# Patient Record
Sex: FEMALE | Race: WHITE | NOT HISPANIC OR LATINO | Employment: FULL TIME | URBAN - METROPOLITAN AREA
[De-identification: names, ages, dates, MRNs, and addresses within clinical notes are randomized per-mention and may not be internally consistent; named-entity substitution may affect disease eponyms.]

---

## 2017-02-03 RX ORDER — ZOLPIDEM TARTRATE 10 MG/1
10 TABLET ORAL
COMMUNITY

## 2017-02-03 RX ORDER — ACETAMINOPHEN AND CODEINE PHOSPHATE 300; 60 MG/1; MG/1
1 TABLET ORAL EVERY 4 HOURS PRN
COMMUNITY
End: 2017-11-09 | Stop reason: CLARIF

## 2017-02-03 RX ORDER — MULTIVIT WITH MINERALS/LUTEIN
1000 TABLET ORAL DAILY
COMMUNITY

## 2017-02-11 ENCOUNTER — ANESTHESIA EVENT (OUTPATIENT)
Dept: PERIOP | Facility: HOSPITAL | Age: 67
End: 2017-02-11
Payer: MEDICARE

## 2017-02-12 RX ORDER — SODIUM CHLORIDE, SODIUM LACTATE, POTASSIUM CHLORIDE, CALCIUM CHLORIDE 600; 310; 30; 20 MG/100ML; MG/100ML; MG/100ML; MG/100ML
20 INJECTION, SOLUTION INTRAVENOUS CONTINUOUS
Status: DISCONTINUED | OUTPATIENT
Start: 2017-02-13 | End: 2017-02-13 | Stop reason: HOSPADM

## 2017-02-13 ENCOUNTER — ANESTHESIA (OUTPATIENT)
Dept: PERIOP | Facility: HOSPITAL | Age: 67
End: 2017-02-13
Payer: MEDICARE

## 2017-02-13 ENCOUNTER — APPOINTMENT (OUTPATIENT)
Dept: RADIOLOGY | Facility: HOSPITAL | Age: 67
End: 2017-02-13
Payer: MEDICARE

## 2017-02-13 ENCOUNTER — HOSPITAL ENCOUNTER (OUTPATIENT)
Facility: HOSPITAL | Age: 67
Setting detail: OUTPATIENT SURGERY
Discharge: HOME | End: 2017-02-13
Attending: PODIATRIST | Admitting: PODIATRIST
Payer: MEDICARE

## 2017-02-13 VITALS
TEMPERATURE: 97.6 F | OXYGEN SATURATION: 94 % | HEIGHT: 65 IN | WEIGHT: 140 LBS | HEART RATE: 56 BPM | SYSTOLIC BLOOD PRESSURE: 118 MMHG | DIASTOLIC BLOOD PRESSURE: 66 MMHG | RESPIRATION RATE: 16 BRPM | BODY MASS INDEX: 23.32 KG/M2

## 2017-02-13 PROCEDURE — 73630 X-RAY EXAM OF FOOT: CPT

## 2017-02-13 PROCEDURE — C1713 ANCHOR/SCREW BN/BN,TIS/BN: HCPCS | Performed by: PODIATRIST

## 2017-02-13 PROCEDURE — 73660 X-RAY EXAM OF TOE(S): CPT

## 2017-02-13 DEVICE — 2.4MM/2.7MM VA-LOCKING CUBOID PLATE/RIGHT: Type: IMPLANTABLE DEVICE | Site: FOOT | Status: FUNCTIONAL

## 2017-02-13 DEVICE — 2.7MM VA LCKNG SCREW SLF-TPNG WITH T8 STARDRIVE RECESS 12MM: Type: IMPLANTABLE DEVICE | Site: FOOT | Status: FUNCTIONAL

## 2017-02-13 DEVICE — 2.7MM VA LCKNG SCREW SLF-TPNG WITH T8 STARDRIVE RECESS 14MM: Type: IMPLANTABLE DEVICE | Site: FOOT | Status: FUNCTIONAL

## 2017-02-13 RX ORDER — ONDANSETRON 2 MG/ML
4 INJECTION INTRAMUSCULAR; INTRAVENOUS EVERY 6 HOURS PRN
Status: DISCONTINUED | OUTPATIENT
Start: 2017-02-13 | End: 2017-02-13 | Stop reason: HOSPADM

## 2017-02-13 RX ORDER — FENTANYL CITRATE/PF 50 MCG/ML
25 SYRINGE (ML) INJECTION
Status: COMPLETED | OUTPATIENT
Start: 2017-02-13 | End: 2017-02-13

## 2017-02-13 RX ORDER — KETOROLAC TROMETHAMINE 30 MG/ML
INJECTION, SOLUTION INTRAMUSCULAR; INTRAVENOUS AS NEEDED
Status: DISCONTINUED | OUTPATIENT
Start: 2017-02-13 | End: 2017-02-13 | Stop reason: SURG

## 2017-02-13 RX ORDER — PROPOFOL 10 MG/ML
INJECTION, EMULSION INTRAVENOUS CONTINUOUS PRN
Status: DISCONTINUED | OUTPATIENT
Start: 2017-02-13 | End: 2017-02-13 | Stop reason: SURG

## 2017-02-13 RX ORDER — OXYCODONE HYDROCHLORIDE 5 MG/1
5 TABLET ORAL EVERY 4 HOURS PRN
Status: DISCONTINUED | OUTPATIENT
Start: 2017-02-13 | End: 2017-02-13 | Stop reason: HOSPADM

## 2017-02-13 RX ORDER — ACETAMINOPHEN 325 MG/1
650 TABLET ORAL EVERY 4 HOURS PRN
Status: DISCONTINUED | OUTPATIENT
Start: 2017-02-13 | End: 2017-02-13 | Stop reason: HOSPADM

## 2017-02-13 RX ORDER — MIDAZOLAM HYDROCHLORIDE 1 MG/ML
INJECTION INTRAMUSCULAR; INTRAVENOUS AS NEEDED
Status: DISCONTINUED | OUTPATIENT
Start: 2017-02-13 | End: 2017-02-13 | Stop reason: SURG

## 2017-02-13 RX ORDER — OXYCODONE HYDROCHLORIDE AND ACETAMINOPHEN 5; 325 MG/1; MG/1
1 TABLET ORAL EVERY 4 HOURS PRN
Qty: 28 TABLET | Refills: 0 | Status: SHIPPED | OUTPATIENT
Start: 2017-02-13 | End: 2017-02-23

## 2017-02-13 RX ORDER — PROPOFOL 10 MG/ML
INJECTION, EMULSION INTRAVENOUS AS NEEDED
Status: DISCONTINUED | OUTPATIENT
Start: 2017-02-13 | End: 2017-02-13 | Stop reason: SURG

## 2017-02-13 RX ORDER — FENTANYL CITRATE 50 UG/ML
INJECTION, SOLUTION INTRAMUSCULAR; INTRAVENOUS AS NEEDED
Status: DISCONTINUED | OUTPATIENT
Start: 2017-02-13 | End: 2017-02-13 | Stop reason: SURG

## 2017-02-13 RX ORDER — OXYCODONE HYDROCHLORIDE 5 MG/1
10 TABLET ORAL EVERY 6 HOURS PRN
Status: DISCONTINUED | OUTPATIENT
Start: 2017-02-13 | End: 2017-02-13 | Stop reason: HOSPADM

## 2017-02-13 RX ADMIN — PROPOFOL 50 MG: 10 INJECTION, EMULSION INTRAVENOUS at 14:20

## 2017-02-13 RX ADMIN — OXYCODONE HYDROCHLORIDE 5 MG: 5 TABLET ORAL at 16:41

## 2017-02-13 RX ADMIN — PROPOFOL 40 MCG/KG/MIN: 10 INJECTION, EMULSION INTRAVENOUS at 14:05

## 2017-02-13 RX ADMIN — FENTANYL CITRATE 25 MCG: 50 INJECTION, SOLUTION INTRAMUSCULAR; INTRAVENOUS at 15:45

## 2017-02-13 RX ADMIN — MIDAZOLAM HYDROCHLORIDE 2 MG: 1 INJECTION, SOLUTION INTRAMUSCULAR; INTRAVENOUS at 13:57

## 2017-02-13 RX ADMIN — PROPOFOL 50 MG: 10 INJECTION, EMULSION INTRAVENOUS at 14:16

## 2017-02-13 RX ADMIN — SODIUM CHLORIDE, SODIUM LACTATE, POTASSIUM CHLORIDE, AND CALCIUM CHLORIDE 20 ML/HR: .6; .31; .03; .02 INJECTION, SOLUTION INTRAVENOUS at 11:37

## 2017-02-13 RX ADMIN — PROPOFOL 50 MG: 10 INJECTION, EMULSION INTRAVENOUS at 14:13

## 2017-02-13 RX ADMIN — FENTANYL CITRATE 25 MCG: 50 INJECTION, SOLUTION INTRAMUSCULAR; INTRAVENOUS at 16:02

## 2017-02-13 RX ADMIN — KETOROLAC TROMETHAMINE 15 MG: 30 INJECTION, SOLUTION INTRAMUSCULAR at 15:27

## 2017-02-13 RX ADMIN — PROPOFOL 50 MG: 10 INJECTION, EMULSION INTRAVENOUS at 14:27

## 2017-02-13 RX ADMIN — CEFAZOLIN SODIUM 2000 MG: 2 SOLUTION INTRAVENOUS at 14:03

## 2017-02-13 RX ADMIN — FENTANYL CITRATE 25 MCG: 50 INJECTION, SOLUTION INTRAMUSCULAR; INTRAVENOUS at 16:12

## 2017-02-13 RX ADMIN — FENTANYL CITRATE 100 MCG: 50 INJECTION, SOLUTION INTRAMUSCULAR; INTRAVENOUS at 15:26

## 2017-02-13 RX ADMIN — FENTANYL CITRATE 25 MCG: 50 INJECTION, SOLUTION INTRAMUSCULAR; INTRAVENOUS at 16:24

## 2017-02-13 RX ADMIN — FENTANYL CITRATE 50 MCG: 50 INJECTION, SOLUTION INTRAMUSCULAR; INTRAVENOUS at 14:03

## 2017-02-13 RX ADMIN — PROPOFOL 50 MG: 10 INJECTION, EMULSION INTRAVENOUS at 15:09

## 2017-02-13 RX ADMIN — SODIUM CHLORIDE, SODIUM LACTATE, POTASSIUM CHLORIDE, AND CALCIUM CHLORIDE: .6; .31; .03; .02 INJECTION, SOLUTION INTRAVENOUS at 13:50

## 2017-02-13 RX ADMIN — FENTANYL CITRATE 50 MCG: 50 INJECTION, SOLUTION INTRAMUSCULAR; INTRAVENOUS at 15:12

## 2017-02-13 RX ADMIN — MIDAZOLAM HYDROCHLORIDE 2 MG: 1 INJECTION, SOLUTION INTRAMUSCULAR; INTRAVENOUS at 14:00

## 2017-02-13 RX ADMIN — PROPOFOL 50 MG: 10 INJECTION, EMULSION INTRAVENOUS at 14:04

## 2017-02-13 RX ADMIN — SODIUM CHLORIDE, SODIUM LACTATE, POTASSIUM CHLORIDE, AND CALCIUM CHLORIDE: .6; .31; .03; .02 INJECTION, SOLUTION INTRAVENOUS at 14:45

## 2017-03-13 ENCOUNTER — TRANSCRIBE ORDERS (OUTPATIENT)
Dept: RADIOLOGY | Facility: CLINIC | Age: 67
End: 2017-03-13

## 2017-03-13 ENCOUNTER — HOSPITAL ENCOUNTER (OUTPATIENT)
Dept: RADIOLOGY | Facility: CLINIC | Age: 67
Discharge: HOME | End: 2017-03-13
Payer: MEDICARE

## 2017-03-13 DIAGNOSIS — Q70.31: ICD-10-CM

## 2017-03-13 DIAGNOSIS — Q70.31: Primary | ICD-10-CM

## 2017-03-13 PROCEDURE — 73630 X-RAY EXAM OF FOOT: CPT

## 2017-03-27 ENCOUNTER — HOSPITAL ENCOUNTER (OUTPATIENT)
Dept: RADIOLOGY | Facility: CLINIC | Age: 67
Discharge: HOME | End: 2017-03-27
Payer: MEDICARE

## 2017-03-27 ENCOUNTER — TRANSCRIBE ORDERS (OUTPATIENT)
Dept: RADIOLOGY | Facility: CLINIC | Age: 67
End: 2017-03-27

## 2017-03-27 DIAGNOSIS — Q70.21: Primary | ICD-10-CM

## 2017-03-27 DIAGNOSIS — Q70.21: ICD-10-CM

## 2017-03-27 PROCEDURE — 73630 X-RAY EXAM OF FOOT: CPT

## 2017-09-14 ENCOUNTER — OFFICE VISIT (OUTPATIENT)
Dept: URGENT CARE | Facility: CLINIC | Age: 67
End: 2017-09-14
Payer: MEDICARE

## 2017-09-14 PROCEDURE — G0463 HOSPITAL OUTPT CLINIC VISIT: HCPCS

## 2017-09-14 PROCEDURE — 99203 OFFICE O/P NEW LOW 30 MIN: CPT

## 2017-10-26 NOTE — PROGRESS NOTES
Assessment  1  Pain, dental (525 9) (K08 89)    Plan  Pain, dental    · Lidocaine Viscous 2 % Mouth/Throat Solution; USE AS DIRECTED    Discussion/Summary  Discussion Summary:   Follow up with pcp up with dentist  with pt that unable to write for dario II med due to RX of tylenol #3 written for herlidocaine to the gum and the tooth  Medication Side Effects Reviewed: Possible side effects of new medications were reviewed with the patient/guardian today  Understands and agrees with treatment plan: The treatment plan was reviewed with the patient/guardian  The patient/guardian understands and agrees with the treatment plan   Counseling Documentation With Imm: The patient, patient's family was counseled regarding instructions for management,-- patient and family education,-- importance of compliance with treatment  Follow Up Instructions: Follow Up with your Primary Care Provider in 1-2 days  If your symptoms worsen, go to the nearest [place] [address] Emergency Department  Chief Complaint  1  Pain  Chief Complaint Free Text Note Form: pt reports tooth pulled last Thursday - had dry socket, dentist repacked yesterday, dressing fell out and repacked today, fell out again and pt is seeking help for pain; pain 10      History of Present Illness  HPI: 80 yo female who is here today for tooth pain, dental procedure last week and then had to pack it again yesterday and this morning  Dr Karen Ruiz at [address] is the dentist she said she saw  She has soreness to the tooth  The pain is on the lower right side, no s/s of inflammation or drainage  Gums are healing  Pt takes tylenol #3 given by another dr and said that it doesn?t help and that she has been taking them around the clock  She has a RX that shehas filled for the last few months for about 120  She states she doesn?t use them often however fills her scripts so she has them  Pt wanting something more for pain for the tooth   She said she called the dentist and told her to come here  Hospital Based Practices Required Assessment:   Pain Assessment   the patient states they have pain  (on a scale of 0 to 10, the patient rates the pain at 10 )   Abuse And Domestic Violence Screen    Yes, the patient is safe at home  -- The patient states no one is hurting them  Depression And Suicide Screen  No, the patient has not had thoughts of hurting themself  No, the patient has not felt depressed in the past 7 days  Prefered Language is  english  Primary Language is  english  Pain: Dearl Albert presents with complaints of pain  Associated symptoms include no stiffness  Review of Systems  Focused-Female:   Constitutional: as noted in HPI    ENT: as noted in HPI  Cardiovascular: no complaints of slow or fast heart rate, no chest pain, no palpitations, no leg claudication or lower extremity edema  Respiratory: no complaints of shortness of breath, no wheezing, no dyspnea on exertion, no orthopnea or PND  ROS Reviewed:   ROS reviewed  Current Meds   1  Levothyroxine Sodium 150 MCG CAPS; Therapy: (Recorded:14Sep2017) to Recorded   2  Lexapro 5 MG Oral Tablet; Therapy: (Recorded:14Sep2017) to Recorded   3  Xanax 0 5 MG Oral Tablet; Therapy: (Recorded:14Sep2017) to Recorded  Medication List Reviewed: The medication list was reviewed and updated today  Allergies  1  No Known Drug Allergies    Vitals  Signs   Recorded: 14Sep2017 07:46PM   Temperature: 97 9 F  Heart Rate: 60  Respiration: 16  Systolic: 641  Diastolic: 65  Height: 5 ft 5 in  Weight: 142 lb   BMI Calculated: 23 63  BSA Calculated: 1 71  O2 Saturation: 96  Pain Scale: 10    Physical Exam    Constitutional   General appearance: No acute distress, well appearing and well nourished  Eyes   Conjunctiva and lids: No swelling, erythema or discharge  Pupils and irises: Equal, round and reactive to light      Ears, Nose, Mouth, and Throat   External inspection of ears and nose: Normal  Otoscopic examination: Tympanic membranes translucent with normal light reflex  Canals patent without erythema  Nasal mucosa, septum, and turbinates: Normal without edema or erythema  Oropharynx: Normal with no erythema, edema, exudate or lesions  -- left tooth pain, no s/s of infection, no swelling noted  no packing seen  Pulmonary   Respiratory effort: No increased work of breathing or signs of respiratory distress  Auscultation of lungs: Clear to auscultation  Musculoskeletal   Gait and station: Normal     Skin   Skin and subcutaneous tissue: Normal without rashes or lesions  Psychiatric   Orientation to person, place, and time: Normal     Mood and affect: Abnormal  -- pt appears tired and states she is tired of the pain she has been dealing with for weeks  Signatures   Electronically signed by :  CHLOE Ramos; Sep 15 2017 12:46PM EST                       (Author)    Electronically signed by : Peterson Agosto DO; Sep 20 2017  2:22PM EST                       (Co-author)

## 2017-11-02 ENCOUNTER — HOSPITAL ENCOUNTER (EMERGENCY)
Facility: HOSPITAL | Age: 67
Discharge: HOME | End: 2017-11-02
Payer: MEDICARE

## 2017-11-02 ENCOUNTER — OFFICE VISIT (OUTPATIENT)
Dept: URGENT CARE | Facility: CLINIC | Age: 67
End: 2017-11-02
Payer: MEDICARE

## 2017-11-02 VITALS
WEIGHT: 143 LBS | OXYGEN SATURATION: 96 % | BODY MASS INDEX: 23.82 KG/M2 | DIASTOLIC BLOOD PRESSURE: 63 MMHG | RESPIRATION RATE: 18 BRPM | HEIGHT: 65 IN | HEART RATE: 54 BPM | TEMPERATURE: 97 F | SYSTOLIC BLOOD PRESSURE: 124 MMHG

## 2017-11-02 DIAGNOSIS — M54.50 LOW BACK PAIN: Primary | ICD-10-CM

## 2017-11-02 DIAGNOSIS — R10.30 LOWER ABDOMINAL PAIN: ICD-10-CM

## 2017-11-02 LAB
ALBUMIN SERPL BCP-MCNC: 3.7 G/DL (ref 3.5–5)
ALP SERPL-CCNC: 56 U/L (ref 46–116)
ALT SERPL W P-5'-P-CCNC: 37 U/L (ref 12–78)
ANION GAP SERPL CALCULATED.3IONS-SCNC: 6 MMOL/L (ref 4–13)
AST SERPL W P-5'-P-CCNC: 26 U/L (ref 5–45)
BACTERIA UR QL AUTO: ABNORMAL /HPF
BASOPHILS # BLD AUTO: 0.02 THOUSANDS/ΜL (ref 0–0.1)
BASOPHILS NFR BLD AUTO: 0 % (ref 0–1)
BILIRUB SERPL-MCNC: 0.4 MG/DL (ref 0.2–1)
BILIRUB UR QL STRIP: NEGATIVE
BUN SERPL-MCNC: 10 MG/DL (ref 5–25)
CALCIUM SERPL-MCNC: 8.7 MG/DL (ref 8.3–10.1)
CHLORIDE SERPL-SCNC: 98 MMOL/L (ref 100–108)
CLARITY UR: CLEAR
CO2 SERPL-SCNC: 30 MMOL/L (ref 21–32)
COLOR UR: ABNORMAL
CREAT SERPL-MCNC: 0.66 MG/DL (ref 0.6–1.3)
EOSINOPHIL # BLD AUTO: 0.14 THOUSAND/ΜL (ref 0–0.61)
EOSINOPHIL NFR BLD AUTO: 2 % (ref 0–6)
ERYTHROCYTE [DISTWIDTH] IN BLOOD BY AUTOMATED COUNT: 12.5 % (ref 11.6–15.1)
GFR SERPL CREATININE-BSD FRML MDRD: 92 ML/MIN/1.73SQ M
GLUCOSE SERPL-MCNC: 82 MG/DL (ref 65–140)
GLUCOSE UR STRIP-MCNC: NEGATIVE MG/DL
HCT VFR BLD AUTO: 42.5 % (ref 34.8–46.1)
HGB BLD-MCNC: 14.8 G/DL (ref 11.5–15.4)
HGB UR QL STRIP.AUTO: NEGATIVE
KETONES UR STRIP-MCNC: NEGATIVE MG/DL
LEUKOCYTE ESTERASE UR QL STRIP: ABNORMAL
LIPASE SERPL-CCNC: 157 U/L (ref 73–393)
LYMPHOCYTES # BLD AUTO: 3 THOUSANDS/ΜL (ref 0.6–4.47)
LYMPHOCYTES NFR BLD AUTO: 49 % (ref 14–44)
MCH RBC QN AUTO: 31.8 PG (ref 26.8–34.3)
MCHC RBC AUTO-ENTMCNC: 34.8 G/DL (ref 31.4–37.4)
MCV RBC AUTO: 91 FL (ref 82–98)
MONOCYTES # BLD AUTO: 0.67 THOUSAND/ΜL (ref 0.17–1.22)
MONOCYTES NFR BLD AUTO: 11 % (ref 4–12)
NEUTROPHILS # BLD AUTO: 2.37 THOUSANDS/ΜL (ref 1.85–7.62)
NEUTS SEG NFR BLD AUTO: 38 % (ref 43–75)
NITRITE UR QL STRIP: NEGATIVE
NON-SQ EPI CELLS URNS QL MICRO: ABNORMAL /HPF
PH UR STRIP.AUTO: 7 [PH] (ref 4.5–8)
PLATELET # BLD AUTO: 164 THOUSANDS/UL (ref 149–390)
PMV BLD AUTO: 10.2 FL (ref 8.9–12.7)
POTASSIUM SERPL-SCNC: 3.6 MMOL/L (ref 3.5–5.3)
PROT SERPL-MCNC: 7.5 G/DL (ref 6.4–8.2)
PROT UR STRIP-MCNC: NEGATIVE MG/DL
RBC # BLD AUTO: 4.65 MILLION/UL (ref 3.81–5.12)
RBC #/AREA URNS AUTO: ABNORMAL /HPF
SODIUM SERPL-SCNC: 134 MMOL/L (ref 136–145)
SP GR UR STRIP.AUTO: <=1.005 (ref 1–1.03)
UROBILINOGEN UR QL STRIP.AUTO: 0.2 E.U./DL
WBC # BLD AUTO: 6.2 THOUSAND/UL (ref 4.31–10.16)
WBC #/AREA URNS AUTO: ABNORMAL /HPF

## 2017-11-02 PROCEDURE — 99213 OFFICE O/P EST LOW 20 MIN: CPT

## 2017-11-02 PROCEDURE — 80053 COMPREHEN METABOLIC PANEL: CPT | Performed by: PHYSICIAN ASSISTANT

## 2017-11-02 PROCEDURE — 96374 THER/PROPH/DIAG INJ IV PUSH: CPT

## 2017-11-02 PROCEDURE — 85025 COMPLETE CBC W/AUTO DIFF WBC: CPT | Performed by: PHYSICIAN ASSISTANT

## 2017-11-02 PROCEDURE — 83690 ASSAY OF LIPASE: CPT | Performed by: PHYSICIAN ASSISTANT

## 2017-11-02 PROCEDURE — G0463 HOSPITAL OUTPT CLINIC VISIT: HCPCS

## 2017-11-02 PROCEDURE — 99284 EMERGENCY DEPT VISIT MOD MDM: CPT

## 2017-11-02 PROCEDURE — 81001 URINALYSIS AUTO W/SCOPE: CPT | Performed by: PHYSICIAN ASSISTANT

## 2017-11-02 PROCEDURE — 36415 COLL VENOUS BLD VENIPUNCTURE: CPT | Performed by: PHYSICIAN ASSISTANT

## 2017-11-02 RX ORDER — KETOROLAC TROMETHAMINE 30 MG/ML
30 INJECTION, SOLUTION INTRAMUSCULAR; INTRAVENOUS ONCE
Status: COMPLETED | OUTPATIENT
Start: 2017-11-02 | End: 2017-11-02

## 2017-11-02 RX ADMIN — KETOROLAC TROMETHAMINE 30 MG: 30 INJECTION, SOLUTION INTRAMUSCULAR at 20:43

## 2017-11-03 NOTE — DISCHARGE INSTRUCTIONS
Rest, increase fluids  Tylenol/motrin for discomfort  Follow up with family doctor if no improvement in 2-3 days  Return to the ER if pain worsens, fevers, vomiting  Acute Low Back Pain, Ambulatory Care   GENERAL INFORMATION:   Acute low back pain  is discomfort in your lower back area that lasts for less than 12 weeks  The word acute is used to describe pain that starts suddenly, worsens quickly, and lasts for a short time  Common symptoms include the following:   · Back stiffness or spasms    · Pain down the back or side of one leg    · Holding yourself in an unusual position or posture to decrease your back pain    · Not being able to find a sitting position that is comfortable    · Slow increase in your pain for 24 to 48 hours after you stress your back    · Tenderness on your lower back or severe pain when you move your back  Seek immediate care for the following symptoms:   · Severe pain    · Sudden stiffness and heaviness in both buttocks down to both legs    · Numbness or weakness in one leg, or pain in both legs    · Numbness in your genital area or across your lower back    · Unable to control your urine or bowel movements  Treatment for acute low back pain  may include any of the following:  · Medicines:      ¨ NSAIDs  help decrease swelling and pain or fever  This medicine is available with or without a doctor's order  NSAIDs can cause stomach bleeding or kidney problems in certain people  If you take blood thinner medicine, always ask your healthcare provider if NSAIDs are safe for you  Always read the medicine label and follow directions  ¨ Muscle relaxers  help decrease muscle spasms pain  ¨ Prescription pain medicine  may be given  Ask how to take this medicine safely  · Surgery  may be needed if your pain is severe and other treatments do not work  Surgery may be needed for conditions of the lumbar spine, such as herniated disc or spinal stenosis    Manage your symptoms: · Sleep on a firm mattress  If you do not have a firm mattress, have someone move your mattress to the floor for a few days  A piece of plywood under your mattress can also help make it firmer  · Apply ice  on your lower back for 15 to 20 minutes every hour or as directed  Use an ice pack, or put crushed ice in a plastic bag  Cover it with a towel  Ice helps prevent tissue damage and decreases swelling and pain  You can alternate ice and heat  · Apply heat  on your lower back for 20 to 30 minutes every 2 hours for as many days as directed  Heat helps decrease pain and muscle spasms  · Go to physical therapy  A physical therapist teaches you exercises to help improve movement and strength, and to decrease pain  Prevent acute low back pain:   · Use proper body mechanics  ¨ Bend at the hips and knees when you  objects  Do not bend from the waist  Use your leg muscles as you lift the load  Do not use your back  Keep the object close to your chest as you lift it  Try not to twist or lift anything above your waist     ¨ Change your position often when you stand for long periods of time  Rest one foot on a small box or footrest, and then switch to the other foot often  ¨ Try not to sit for long periods of time  When you do, sit in a straight-backed chair with your feet flat on the floor  Never reach, pull, or push while you are sitting  · Exercise regularly  Warm up before you exercise  Do exercises that strengthen your back muscles  Ask about the best exercise plan for you  · Maintain a healthy weight  Ask your healthcare provider how much you should weigh  Ask him to help you create a weight loss plan if you are overweight  Follow up with your healthcare provider as directed:  Return for a follow-up visit if you still have pain after 1 to 3 weeks of treatment  You may need to visit an orthopedist if your back pain lasts more than 6 to 12 weeks   Write down your questions so you remember to ask them during your visits  CARE AGREEMENT:   You have the right to help plan your care  Learn about your health condition and how it may be treated  Discuss treatment options with your caregivers to decide what care you want to receive  You always have the right to refuse treatment  The above information is an  only  It is not intended as medical advice for individual conditions or treatments  Talk to your doctor, nurse or pharmacist before following any medical regimen to see if it is safe and effective for you  © 2014 [address] is for End User's use only and may not be sold, redistributed or otherwise used for commercial purposes  All illustrations and images included in CareNotes® are the copyrighted property of A D A M [employer] Inc  or [employer]  Abdominal Pain   WHAT YOU NEED TO KNOW:   Abdominal pain can be dull, achy, or sharp  You may have pain in one area of your abdomen, or in your entire abdomen  Your pain may be caused by a condition such as constipation, food sensitivity or poisoning, infection, or a blockage  Abdominal pain can also be from a hernia, appendicitis, or an ulcer  Liver, gallbladder, or kidney conditions can also cause abdominal pain  The cause of your abdominal pain may be unknown  DISCHARGE INSTRUCTIONS:   Return to the emergency department if:   · You have new chest pain or shortness of breath  · You have pulsing pain in your upper abdomen or lower back that suddenly becomes constant  · Your pain is in the right lower abdominal area and worsens with movement  · You have a fever over 100 4°F (38°C) or shaking chills  · You are vomiting and cannot keep food or liquids down  · Your pain does not improve or gets worse over the next 8 to 12 hours  · You see blood in your vomit or bowel movements, or they look black and tarry  · Your skin or the whites of your eyes turn yellow       · You are a woman and have a large amount of vaginal bleeding that is not your monthly period  Contact your healthcare provider if:   · You have pain in your lower back  · You are a man and have pain in your testicles  · You have pain when you urinate  · You have questions or concerns about your condition or care  Follow up with your healthcare provider within 24 hours or as directed:  Write down your questions so you remember to ask them during your visits  Medicines:   · Medicines  may be given to calm your stomach and prevent vomiting or to decrease pain  Ask how to take pain medicine safely  · Take your medicine as directed  Contact your healthcare provider if you think your medicine is not helping or if you have side effects  Tell him of her if you are allergic to any medicine  Keep a list of the medicines, vitamins, and herbs you take  Include the amounts, and when and why you take them  Bring the list or the pill bottles to follow-up visits  Carry your medicine list with you in case of an emergency  © 2017 [address] Information is for End User's use only and may not be sold, redistributed or otherwise used for commercial purposes  All illustrations and images included in CareNotes® are the copyrighted property of [employer] , [employer]  or Harrison Arthur  The above information is an  only  It is not intended as medical advice for individual conditions or treatments  Talk to your doctor, nurse or pharmacist before following any medical regimen to see if it is safe and effective for you

## 2017-11-03 NOTE — ED PROVIDER NOTES
History  Chief Complaint   Patient presents with    Abdominal Pain     pt presents to ER stating around 11am she started with stomach spasms, when she was driving home from work around [place] [address], she then had BL lower back pain with nausea  denies any difficulty or changes with urination      Patient presents to the ED with lower abdominal pain and low back pain that started today  SHe states she was driving to work and had spasm in her stomach, but now has pains in her lower back  She no longer has pain in her abdomen  She denies fevers, but had chills  She denies any diarrhea or constipation  SHe denies vomiting, but has nausea  She took tylenol #3 for the pain, but it didn't help  Patient denies any urinary symptoms  History provided by:  Patient  Abdominal Pain   Pain location:  RLQ and LLQ  Pain quality: aching    Pain radiates to:  Back  Pain severity:  Moderate  Onset quality:  Sudden  Duration:  1 day  Timing:  Constant  Progression:  Unchanged  Chronicity:  New  Relieved by:  Nothing  Worsened by:  Nothing  Ineffective treatments: tylenol #3  Associated symptoms: belching, chills and nausea    Associated symptoms: no chest pain, no constipation, no cough, no diarrhea, no dysuria, no fever, no shortness of breath, no sore throat and no vomiting        Prior to Admission Medications   Prescriptions Last Dose Informant Patient Reported? Taking? ALPRAZolam (XANAX) 0 25 mg tablet   Yes No   Sig: Take 0 25 mg by mouth daily at bedtime as needed for anxiety     Ascorbic Acid (VITAMIN C) 1000 MG tablet   Yes No   Sig: Take 1,000 mg by mouth daily   B Complex Vitamins (VITAMIN B COMPLEX PO)   Yes No   Sig: Take by mouth   CALCIUM PO   Yes No   Sig: Take by mouth   VITAMIN E PO   Yes No   Sig: Take by mouth   acetaminophen-codeine (TYLENOL with CODEINE #4) 300-60 MG per tablet   Yes No   Sig: Take 1 tablet by mouth every 4 (four) hours as needed for moderate pain   escitalopram (LEXAPRO) 10 mg tablet Yes No   Sig: Take 10 mg by mouth daily  levothyroxine 75 mcg tablet   Yes No   Sig: Take 75 mcg by mouth daily  zolpidem (AMBIEN) 10 mg tablet   Yes No   Sig: Take 10 mg by mouth daily at bedtime as needed for sleep      Facility-Administered Medications: None       Past Medical History:   Diagnosis Date    Anxiety     [facility]) 2012    right sided breast lumpectomy with 3 lymph nodes removed    Depression     Disease of thyroid gland     H/O radioactive iodine thyroid ablation     History of pneumonia     History of transfusion     Infectious viral hepatitis     treated x2 stopped b/c reaction, levels checked regularly    Irritable bowel syndrome        Past Surgical History:   Procedure Laterality Date    CHOLECYSTECTOMY      MT FUSION BIG TOE,MT-P JT Right 2/13/2017    Procedure: FUSION GREAT TOE AND PLATE;  Surgeon: Evan Weinberg DPM;  Location: [facility] OR;  Service: Podiatry       Family History   Problem Relation Age of Onset    Diabetes Mother     Heart disease Mother     Heart disease Father      I have reviewed and agree with the history as documented  Social History   Substance Use Topics    Smoking status: Former Smoker     Packs/day: 0 50     Years: 46 00     Types: Cigarettes     Quit date: 2017    Smokeless tobacco: Never Used    Alcohol use No        Review of Systems   Constitutional: Positive for chills  Negative for fever  HENT: Negative for congestion and sore throat  Eyes: Negative for visual disturbance  Respiratory: Negative for cough and shortness of breath  Cardiovascular: Negative for chest pain  Gastrointestinal: Positive for abdominal pain and nausea  Negative for abdominal distention, blood in stool, constipation, diarrhea and vomiting  Genitourinary: Negative for dysuria, frequency and urgency  Musculoskeletal: Positive for back pain  Skin: Negative for color change, pallor, rash and wound     Neurological: Negative for dizziness, weakness and numbness  Psychiatric/Behavioral: Negative for confusion  All other systems reviewed and are negative  Physical Exam  ED Triage Vitals [11/02/17 2022]   Temperature Pulse Respirations Blood Pressure SpO2   (!) 97 °F (36 1 °C) 57 18 152/73 98 %      Temp Source Heart Rate Source Patient Position - Orthostatic VS BP Location FiO2 (%)   Temporal -- -- Right arm --      Pain Score       8           Orthostatic Vital Signs  Vitals:    11/02/17 2022 11/02/17 2030   BP: 152/73 116/69   Pulse: 57 58       Physical Exam   Constitutional: She is oriented to person, place, and time  She appears well-developed and well-nourished  She is active and cooperative  She does not appear ill  No distress  HENT:   Head: Normocephalic and atraumatic  Right Ear: Hearing normal    Left Ear: Hearing normal    Nose: Nose normal    Mouth/Throat: Oropharynx is clear and moist    Eyes: Conjunctivae are normal    Neck: Normal range of motion  Cardiovascular: Regular rhythm and normal heart sounds  Bradycardia present  No murmur heard  Pulmonary/Chest: Effort normal and breath sounds normal  She has no wheezes  She has no rhonchi  She has no rales  Abdominal: Soft  Normal appearance and bowel sounds are normal  There is tenderness in the right lower quadrant and left lower quadrant  There is no rigidity, no rebound, no guarding and no CVA tenderness  Musculoskeletal:        Lumbar back: She exhibits tenderness (low back)  She exhibits no bony tenderness, no swelling and no deformity  Neurological: She is alert and oriented to person, place, and time  She has normal strength  No sensory deficit  Gait normal    Skin: Skin is warm and dry  No rash noted  She is not diaphoretic  No pallor  Psychiatric: She has a normal mood and affect  Nursing note and vitals reviewed        ED Medications  Medications   ketorolac (TORADOL) 30 mg/mL injection 30 mg (30 mg Intravenous Given 11/2/17 2043)       Diagnostic Studies  Results Reviewed     Procedure Component Value Units Date/Time    Urine Microscopic [[id number]]  (Abnormal) Collected:  11/02/17 2046    Lab Status:  Final result Specimen:  Urine from Urine, Clean Catch Updated:  11/02/17 2122     RBC, UA None Seen /hpf      WBC, UA 0-1 (A) /hpf      Epithelial Cells Occasional /hpf      Bacteria, UA Occasional /hpf     Comprehensive metabolic panel [[id number]]  (Abnormal) Collected:  11/02/17 2039    Lab Status:  Final result Specimen:  Blood from Arm, Right Updated:  11/02/17 2121     Sodium 134 (L) mmol/L      Potassium 3 6 mmol/L      Chloride 98 (L) mmol/L      CO2 30 mmol/L      Anion Gap 6 mmol/L      BUN 10 mg/dL      Creatinine 0 66 mg/dL      Glucose 82 mg/dL      Calcium 8 7 mg/dL      AST 26 U/L      ALT 37 U/L      Alkaline Phosphatase 56 U/L      Total Protein 7 5 g/dL      Albumin 3 7 g/dL      Total Bilirubin 0 40 mg/dL      eGFR 92 ml/min/1 73sq m     Narrative:         National Kidney Disease Education Program recommendations are as follows:  GFR calculation is accurate only with a steady state creatinine  Chronic Kidney disease less than 60 ml/min/1 73 sq  meters  Kidney failure less than 15 ml/min/1 73 sq  meters      Lipase [[id number]]  (Normal) Collected:  11/02/17 2039    Lab Status:  Final result Specimen:  Blood from Arm, Right Updated:  11/02/17 2121     Lipase 157 u/L     UA w Reflex to Microscopic w Reflex to Culture [[id number]]  (Abnormal) Collected:  11/02/17 2046    Lab Status:  Final result Specimen:  Urine from Urine, Clean Catch Updated:  11/02/17 2107     Color, UA Straw     Clarity, UA Clear     Specific Gravity, UA <=1 005     pH, UA 7 0     Leukocytes, UA Small (A)     Nitrite, UA Negative     Protein, UA Negative mg/dl      Glucose, UA Negative mg/dl      Ketones, UA Negative mg/dl      Urobilinogen, UA 0 2 E U /dl      Bilirubin, UA Negative     Blood, UA Negative    CBC and differential [[id number]]  (Abnormal) Collected:  11/02/17 2039 Lab Status:  Final result Specimen:  Blood from Arm, Right Updated:  11/02/17 2104     WBC 6 20 Thousand/uL      RBC 4 65 Million/uL      Hemoglobin 14 8 g/dL      Hematocrit 42 5 %      MCV 91 fL      MCH 31 8 pg      MCHC 34 8 g/dL      RDW 12 5 %      MPV 10 2 fL      Platelets 122 Thousands/uL      Neutrophils Relative 38 (L) %      Lymphocytes Relative 49 (H) %      Monocytes Relative 11 %      Eosinophils Relative 2 %      Basophils Relative 0 %      Neutrophils Absolute 2 37 Thousands/µL      Lymphocytes Absolute 3 00 Thousands/µL      Monocytes Absolute 0 67 Thousand/µL      Eosinophils Absolute 0 14 Thousand/µL      Basophils Absolute 0 02 Thousands/µL                  No orders to display              Procedures  Procedures       Phone Contacts  ED Phone Contact    ED Course  ED Course                                MDM  Number of Diagnoses or Management Options  Low back pain: new and requires workup  Lower abdominal pain: new and requires workup  Diagnosis management comments: Patient with low back pain, will check urine to r/o UTI/pyelo  Doubt stone since no hematuria and pain b/l lower back  Patient did have improvement after toradol  Patient instructed to f/u with PCP if pain continues and to return to ER if fevers, vomiting, pain worsens  Amount and/or Complexity of Data Reviewed  Clinical lab tests: ordered and reviewed    Patient Progress  Patient progress: stable    CritCare Time    Disposition  Final diagnoses:   Low back pain   Lower abdominal pain     Time reflects when diagnosis was documented in both MDM as applicable and the Disposition within this note     Time User Action Codes Description Comment    11/2/2017  9:28 PM Ford Mendoza [M54 5] Low back pain     11/2/2017  9:28 PM Ford Mendoza [R10 30] Lower abdominal pain       ED Disposition     ED Disposition Condition Comment    Discharge  [facility] discharge to home/self care      Condition at discharge: Stable        Follow-up Information     Follow up With Specialties Details Why Contact Info    Roxanne Sol DO  In 2 days For recheck [address]  [facility] [address]  [facility] [address]  [phone]          Patient's Medications   Discharge Prescriptions    No medications on file     No discharge procedures on file      ED Provider  Electronically Signed by           Margret Cisse PA-C  11/02/17 1140

## 2017-11-09 NOTE — PRE-PROCEDURE INSTRUCTIONS
Before your operation, you play an important role in decreasing your risk for infection by washing with special antiseptic soap  This is an effective way to reduce bacteria on the skin which may help to prevent infections at the surgical site  Please read the following directions in advance  1  In the week before your operation purchase a 4 ounce bottle of antiseptic soap containing chlorhexidine gluconate 4%  Some brand names include: Aplicare, Endure, and Hibiclens  The cost is usually less than $5 00  · For your convenience, the [address] [place] [address] carries the soap  · It may also be available at your doctor's office or pre-admission testing center, and at most retail pharmacies  · If you are allergic or sensitive to soaps containing chlorhexidine gluconate (CHG), please let your doctor know so another antiseptic soap can be suggested  · CHG antiseptic soap is for external use only  2      The day before your operation follow these directions carefully to get ready  · Place clean lines (sheets) on your bed; you should sleep on clean sheets after your evening shower  · Get clean towels and washcloths ready - you need enough for 2 showers  · Set aside clean underwear, pajamas, and clothing to wear after the shower  Reminders:  · DO NOT use any other soap or body rinse on your skin during or after the antiseptic showers  · DO NOT use lotion , powder, deodorant, or perfume/aftershave of any kind on your skin after your antiseptic shower  · DO NOT shave any body parts in the 24 hours/the day before your operation  · DO NOT get the antiseptic soap in your eyes, ears, nose, mouth, or vaginal area  3      You will need to shower the night before AND the morning of your Surgery  Shower 1:  · The evening before your operation, take the fist shower  · First, shampoo your hair with regular shampoo and rinse it completely before you use the anitseptic soap    After washing and rinsing your hair, rinse your body  · Next, use a clean wash cloth to apply the antiseptic soap and wash your body from the neck down to your toes using 1/2 bottle of the antiseptic soap  You will use the other 1/2 bottle for the second shower  · Clean the area where your incision will be; later this area well for about 2 minutes  · If you ar having head or neck surgery, wash areas with the antiseptic soap  · Rinse yourself completely with running water  · Use a clean towel to dry off  · Wear clean underwear and clothing/pajamas  Shower 2:  · The Morning of your operation, take the second shower following the same steps as Shower 1 using the second 1/2 of the bottle of antiseptic soap  · Use clean cloths and towels to was and dry yourself off  · Wear clean underwear and clothing  The following information was developed to assist you to prepare for your operation  What do I need to do before coming to the hospital?  · Arrange for a responsible person to drive you to and from the hospital   · Arrange care for your children at home  Children are not allowed in the recovery area of the hospital   · Plan to wear clothing that is easy to put on and take off  If you are having shoulder surgery, wear a shirt that buttons or zippers in front  Bathing  · Shower the evening before and the morning of your surgery with antibacterial soap  Please refer to the Pre Op Showering Instructions for Surgery Patient Sheet  · Remove nail polish and all body piercing jewelry  · Do not shave any body part for at least 24 hours before surgery-this includes face, arm, legs and upper body  Food  · Nothing to eat or drink after midnight the night before your surgery  This includes candy and chewing gum    Exception: If your surgery is after 12:00 pm (noon), you may have clear liquids such as 7-Up, ginger ale, apple or cranberry juice, Jello-O, water, or clear broth until 8:00 am   · Do not drink mild or juice with pulp on the morning before surgery  · Do not drink alcohol 24 hours before surgery  · Do not smoke 12 hours before surgery  Medicine  · Follow instructions you are received from your surgeon about which medicines you may take on the day of surgery  · If instructed to take medicine on the morning of surgery, take pills with just a small sip of water  Call your prescribing doctor for specific instructions on what to do if you take insulin  What should I bring to the hospital?  Bring  · Crutches or a walker, if you have them, for foot or knee surgery  · A list of the daily medications, vitamins, minerals, herbals and nutritional supplements you take  Include the dosages of medicines and the time you take them each day  · Glasses, dentures or hearing aids  · Minimal clothing; you will be wearing hospital sleepwear  · Photo ID; required to verify your identity  · If you have a Living Will or Power of , bring a copy of the documents  (only if being admitted)  · If you have an ostomy, bring an extra pouch and any supplies you use  Do Not Bring  · Medicines or Inhalers  · Money, valuables or jewelry    What other information should I know about the day of surgery? · Notify your surgeon if you develop a cold, sore throat, cough, fever, rash or any other illness  · Report to the Ambulatory Surgical/Same Day Surgery Unit  You will be instructed to stop at Registration only if you have not been pre-registered  · Inform your  if they do not stay that they will be asked by the staff to leave a phone number where they can be reached  · Be available to be reached before surgery  In the even the operating room schedule changes, you may be asked to come in earlier or later than expected  It is important to tell your doctor and others involved in your health care if you are taking or have been taking any non-prescription drugs, vitamins, minerals, herbals or other nutritional supplements    Any of these may interact with some food or medicines and cause a reaction  Pre-Surgery Instructions:   Medication Instructions    Acetaminophen-Codeine (TYLENOL WITH CODEINE #3 PO) Patient was instructed to contact Physician for medication instruction   ALPRAZolam (XANAX) 0 25 mg tablet Patient was instructed to contact Physician for medication instruction   Ascorbic Acid (VITAMIN C) 1000 MG tablet Patient was instructed to contact Physician for medication instruction   B Complex Vitamins (VITAMIN B COMPLEX PO) Instructed patient per Anesthesia Guidelines   CALCIUM PO Patient was instructed to contact Physician for medication instruction   escitalopram (LEXAPRO) 10 mg tablet Patient was instructed to contact Physician for medication instruction   levothyroxine 75 mcg tablet advised to take w/sip of H2O am of sx   VITAMIN E PO Instructed patient per Anesthesia Guidelines   zolpidem (AMBIEN) 10 mg tablet Patient was instructed to contact Physician for medication instruction

## 2017-11-13 ENCOUNTER — ANESTHESIA (OUTPATIENT)
Dept: PERIOP | Facility: HOSPITAL | Age: 67
End: 2017-11-13
Payer: MEDICARE

## 2017-11-13 ENCOUNTER — ANESTHESIA EVENT (OUTPATIENT)
Dept: PERIOP | Facility: HOSPITAL | Age: 67
End: 2017-11-13
Payer: MEDICARE

## 2017-11-13 ENCOUNTER — HOSPITAL ENCOUNTER (OUTPATIENT)
Facility: HOSPITAL | Age: 67
Setting detail: OUTPATIENT SURGERY
Discharge: HOME | End: 2017-11-13
Attending: PODIATRIST | Admitting: PODIATRIST
Payer: MEDICARE

## 2017-11-13 VITALS
WEIGHT: 140 LBS | BODY MASS INDEX: 23.32 KG/M2 | HEIGHT: 65 IN | TEMPERATURE: 98.1 F | HEART RATE: 72 BPM | SYSTOLIC BLOOD PRESSURE: 107 MMHG | OXYGEN SATURATION: 95 % | DIASTOLIC BLOOD PRESSURE: 52 MMHG | RESPIRATION RATE: 17 BRPM

## 2017-11-13 PROCEDURE — C1713 ANCHOR/SCREW BN/BN,TIS/BN: HCPCS | Performed by: PODIATRIST

## 2017-11-13 DEVICE — K-WIRE 1.6MM X 9INL SMTH XMOND/XMOND: Type: IMPLANTABLE DEVICE | Status: FUNCTIONAL

## 2017-11-13 RX ORDER — OXYCODONE HYDROCHLORIDE 5 MG/1
5 TABLET ORAL EVERY 4 HOURS PRN
Status: DISCONTINUED | OUTPATIENT
Start: 2017-11-13 | End: 2017-11-13 | Stop reason: HOSPADM

## 2017-11-13 RX ORDER — KETOROLAC TROMETHAMINE 30 MG/ML
INJECTION, SOLUTION INTRAMUSCULAR; INTRAVENOUS AS NEEDED
Status: DISCONTINUED | OUTPATIENT
Start: 2017-11-13 | End: 2017-11-13 | Stop reason: SURG

## 2017-11-13 RX ORDER — SODIUM CHLORIDE, SODIUM LACTATE, POTASSIUM CHLORIDE, CALCIUM CHLORIDE 600; 310; 30; 20 MG/100ML; MG/100ML; MG/100ML; MG/100ML
20 INJECTION, SOLUTION INTRAVENOUS CONTINUOUS
Status: DISCONTINUED | OUTPATIENT
Start: 2017-11-13 | End: 2017-11-13 | Stop reason: HOSPADM

## 2017-11-13 RX ORDER — FENTANYL CITRATE/PF 50 MCG/ML
50 SYRINGE (ML) INJECTION
Status: DISCONTINUED | OUTPATIENT
Start: 2017-11-13 | End: 2017-11-13 | Stop reason: HOSPADM

## 2017-11-13 RX ORDER — PROPOFOL 10 MG/ML
INJECTION, EMULSION INTRAVENOUS AS NEEDED
Status: DISCONTINUED | OUTPATIENT
Start: 2017-11-13 | End: 2017-11-13 | Stop reason: SURG

## 2017-11-13 RX ORDER — ONDANSETRON 2 MG/ML
INJECTION INTRAMUSCULAR; INTRAVENOUS AS NEEDED
Status: DISCONTINUED | OUTPATIENT
Start: 2017-11-13 | End: 2017-11-13 | Stop reason: SURG

## 2017-11-13 RX ORDER — MIDAZOLAM HYDROCHLORIDE 1 MG/ML
INJECTION INTRAMUSCULAR; INTRAVENOUS AS NEEDED
Status: DISCONTINUED | OUTPATIENT
Start: 2017-11-13 | End: 2017-11-13 | Stop reason: SURG

## 2017-11-13 RX ORDER — EPHEDRINE SULFATE 50 MG/ML
INJECTION, SOLUTION INTRAVENOUS AS NEEDED
Status: DISCONTINUED | OUTPATIENT
Start: 2017-11-13 | End: 2017-11-13 | Stop reason: SURG

## 2017-11-13 RX ORDER — LORAZEPAM 2 MG/ML
1 INJECTION INTRAMUSCULAR ONCE
Status: DISCONTINUED | OUTPATIENT
Start: 2017-11-13 | End: 2017-11-13 | Stop reason: HOSPADM

## 2017-11-13 RX ORDER — GLYCOPYRROLATE 0.2 MG/ML
INJECTION INTRAMUSCULAR; INTRAVENOUS AS NEEDED
Status: DISCONTINUED | OUTPATIENT
Start: 2017-11-13 | End: 2017-11-13 | Stop reason: SURG

## 2017-11-13 RX ORDER — FENTANYL CITRATE 50 UG/ML
INJECTION, SOLUTION INTRAMUSCULAR; INTRAVENOUS AS NEEDED
Status: DISCONTINUED | OUTPATIENT
Start: 2017-11-13 | End: 2017-11-13 | Stop reason: SURG

## 2017-11-13 RX ORDER — OXYCODONE HYDROCHLORIDE AND ACETAMINOPHEN 5; 325 MG/1; MG/1
1 TABLET ORAL EVERY 4 HOURS PRN
Qty: 20 TABLET | Refills: 0 | Status: SHIPPED | OUTPATIENT
Start: 2017-11-13 | End: 2017-11-23

## 2017-11-13 RX ADMIN — SODIUM CHLORIDE, SODIUM LACTATE, POTASSIUM CHLORIDE, AND CALCIUM CHLORIDE: .6; .31; .03; .02 INJECTION, SOLUTION INTRAVENOUS at 12:19

## 2017-11-13 RX ADMIN — GLYCOPYRROLATE 0.2 MG: 0.2 INJECTION, SOLUTION INTRAMUSCULAR; INTRAVENOUS at 12:23

## 2017-11-13 RX ADMIN — EPHEDRINE SULFATE 10 MG: 50 INJECTION, SOLUTION INTRAMUSCULAR; INTRAVENOUS; SUBCUTANEOUS at 12:54

## 2017-11-13 RX ADMIN — FENTANYL CITRATE 25 MCG: 50 INJECTION, SOLUTION INTRAMUSCULAR; INTRAVENOUS at 13:09

## 2017-11-13 RX ADMIN — ONDANSETRON 4 MG: 2 INJECTION INTRAMUSCULAR; INTRAVENOUS at 13:39

## 2017-11-13 RX ADMIN — OXYCODONE HYDROCHLORIDE 5 MG: 5 TABLET ORAL at 15:02

## 2017-11-13 RX ADMIN — KETOROLAC TROMETHAMINE 30 MG: 30 INJECTION, SOLUTION INTRAMUSCULAR at 13:56

## 2017-11-13 RX ADMIN — FENTANYL CITRATE 25 MCG: 50 INJECTION, SOLUTION INTRAMUSCULAR; INTRAVENOUS at 13:16

## 2017-11-13 RX ADMIN — PROPOFOL 200 MG: 10 INJECTION, EMULSION INTRAVENOUS at 12:33

## 2017-11-13 RX ADMIN — EPHEDRINE SULFATE 10 MG: 50 INJECTION, SOLUTION INTRAMUSCULAR; INTRAVENOUS; SUBCUTANEOUS at 12:40

## 2017-11-13 RX ADMIN — MIDAZOLAM HYDROCHLORIDE 2 MG: 1 INJECTION, SOLUTION INTRAMUSCULAR; INTRAVENOUS at 12:23

## 2017-11-13 RX ADMIN — FENTANYL CITRATE 50 MCG: 50 INJECTION, SOLUTION INTRAMUSCULAR; INTRAVENOUS at 12:33

## 2017-11-13 RX ADMIN — EPHEDRINE SULFATE 10 MG: 50 INJECTION, SOLUTION INTRAMUSCULAR; INTRAVENOUS; SUBCUTANEOUS at 13:22

## 2017-11-13 RX ADMIN — SODIUM CHLORIDE, SODIUM LACTATE, POTASSIUM CHLORIDE, AND CALCIUM CHLORIDE: .6; .31; .03; .02 INJECTION, SOLUTION INTRAVENOUS at 13:45

## 2017-11-13 NOTE — ANESTHESIA PREPROCEDURE EVALUATION
Review of Systems/Medical History  Patient summary reviewed  Chart reviewed      Cardiovascular  Negative cardio ROS    Pulmonary  Smoker cigarette smoker , ,        GI/Hepatic    Liver disease, , Hepatitis C,        Negative  ROS        Endo/Other  History of thyroid disease , hypothyroidism,      GYN    Breast cancer axillary node dissection and left lumpectomy       Hematology   Musculoskeletal       Neurology   Psychology   Anxiety, Depression , depressed,            Physical Exam    Airway    Mallampati score: II  TM Distance: >3 FB  Neck ROM: full     Dental   No notable dental hx     Cardiovascular  Comment: Negative ROS, Cardiovascular exam normal    Pulmonary  Pulmonary exam normal     Other Findings        Anesthesia Plan  ASA Score- 2       Anesthesia Type- general with ASA Monitors  Additional Monitors:   Airway Plan: LMA  Induction- intravenous  Informed Consent- Anesthetic plan and risks discussed with patient

## 2017-11-13 NOTE — DISCHARGE INSTRUCTIONS
Dr Evan Weinberg, DPM  Post-op surgery Instructions    Pain / Swelling   There is expected to be some discomfort, swelling and bruising of the foot  You might see some blood on the bandage  This is not a cause for alarm  However, if there is active or persistent bleeding (blood running out of the bandage while at rest) - call the office at once (or) go to a [facility] ER and ask them to page the podiatry residents   Apply an ice bag to the top of your ankle for 30 minutes for each waking hour, for the first 72 hours  This should be discontinued when sleeping  This will also work through your cast if you have one  Ice must not leak and wet the dressings  Also, using the ice inappropriately can cause permanent nerve damage   Your foot should be elevated as much as possible for the first 72 hours  The foot should be above heart level  If your foot is below heart level, throbbing and pain will increase  When sleeping, elevation can be accomplished by putting a small hard suitcase between the box spring and mattress at the foot of the bed  Walking and standing will increase pain, throbbing and bleeding   Persistent pain despite elevation and your pain meds can many times be relieved by removing the tight brown compression layer (called the ACE wrap) that is over the white gauze dressing  If you are elevating and taking your pain meds and pain is still severe, remove this brown stretchy layer but leave the gauze intact  Wait 30 minutes  If the pain subsides, reapply the ACE so its not so tight  If pain doesnt get better, call your doctor  Dressings / Casts   Do not remove your surgical dressings - they will be changed at your doctor appointment  Do not allow surgical dressings to get wet  Sponge baths should be used until the sutures are removed  Do not try to keep the foot dry using a garbage bag and tape - this rarely works    If you get your dressings or cast wet - call your doctor immediately   If your cast or dressings feel tight - elevate your foot for 30 minutes  If this doesnt help and you feel tingling or see toe discoloration - call your doctor or go to a [facility] ER and ask them to page the podiatry residents   Do not put things in your cast such as powder, coat hangers to scratch, etc  This can cause skin damage and infections  Infection   If you have a fever at or above 100 degrees, chills, sweats, or see red streaks rising above the dressing or smell odor / see pus (creamy white drainage), call your doctor immediately or go to a [facility] ER and ask them to page the podiatry residents  Constipation   If you have severe constipation after surgery, this can be due to the pain medication  Notify your doctor and special medication will be prescribed to deal with this  Blood Clots   If you had surgery and are in a cast, have an external fixation device, or are non-weightbearing using crutches, a knee scooter, a wheelchair, a walker, or an iWalk device - you need to be on a blood thinner  Your doctor will prescribe one of the regimens below  If you run out of the blood thinner checked off below before you are walking normally on your foot and out of your cast - notify your doctor immediately so you can get a refill  Not doing so can lead to blood clots and serious complications including death  Numbness   It is normal for your foot to be numb until about dinner time  If youve had a popliteal block procedure, you might be numb until the following day  When you start to feel pins and needles in the foot - this means the block is wearing off  That is the appropriate time to take your pain medication  Pain Medication   Do not supplement your pain medication with over the counter drugs, old leftover pain medications, or extra Tylenol  You must discuss any additional medications with your doctor prior to taking them for pain     Driving   No driving is allowed without discussion with the doctor  Ambulation   Weight bear as tolerated to surgical foot in surgical shoe   If given a flat, stiff shoe / darco wedge shoe, Do not walk at all without it     Use a device (cane, walker, crutches) to take some weight off of the foot when walking   If instructed not to put weight on the surgical foot, use the following:

## 2017-11-13 NOTE — DISCHARGE SUMMARY
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

## 2017-11-13 NOTE — ANESTHESIA POSTPROCEDURE EVALUATION
Post-Op Assessment Note      CV Status:  Stable    Mental Status:  Alert    Hydration Status:  Stable    PONV Controlled:  None    Airway Patency:  Patent    Post Op Vitals Reviewed: Yes          Staff: CRNA           BP      Temp      Pulse     Resp      SpO2

## 2017-11-16 NOTE — PROGRESS NOTES
Assessment    1  Low back pain (724 2) (M54 5)   2  Bilateral flank pain (789 09) (R10 9)    Plan  Bilateral flank pain    · *1 - [facility] (EMERGENCY MEDICINE ) Co-Management  *  Status: Hold For -Scheduling  Requested for: [id number]  Care Summary provided  : Yes    Discussion/Summary  Discussion Summary:   Pt to go to ER for evaluation due to symptomsmade aware  Medication Side Effects Reviewed: Possible side effects of new medications were reviewed with the patient/guardian today  Understands and agrees with treatment plan: The treatment plan was reviewed with the patient/guardian  The patient/guardian understands and agrees with the treatment plan   Counseling Documentation With Imm: The patient was counseled regarding instructions for management,-- patient and family education,-- importance of compliance with treatment  Follow Up Instructions: Follow Up with your Primary Care Provider in 1-2 days  If your symptoms worsen, go to the nearest [facility] Emergency Department  Chief Complaint    1  Flank Pain  Chief Complaint Free Text Note Form: pt reports cramping in stomach with nausea this am, 1600 started with flank pain, c/o HA, pt states she feels off; denies any discomfort when urinating, denies fevers      History of Present Illness  HPI: 80 yo female, abdominal pain, flank pain and soreness, intermittent since  IT has been present since 11AM  She has taken nothing for the symptoms but tylenol  She slept for an hr and the symptoms worsened  No pain with urinating She has nausea and no vomiting at this time  She takes pain medicine daily and took tylenol #3 today and no relief  Hospital Based Practices Required Assessment:  Pain Assessment  the patient states they have pain  The patient describes the pain as sharp  (on a scale of 0 to 10, the patient rates the pain at 8 )  Abuse And Domestic Violence Screen   Yes, the patient is safe at home  -- The patient states no one is hurting them  Depression And Suicide Screen  No, the patient has not had thoughts of hurting themself  No, the patient has not felt depressed in the past 7 days  Prefered Language is  english  Primary Language is  english  Flank Pain: Radha Olmos presents with complaints of flank pain  Associated symptoms include back pain-- and-- nausea, but-- no fever,-- no vomiting,-- no abdominal pain,-- no flank mass,-- no abdominal mass,-- no paresthesias,-- no groin pain,-- no gluteal pain-- and-- no leg pain  Review of Systems  Focused-Female:  Constitutional: as noted in HPI  Gastrointestinal: as noted in HPI  Musculoskeletal: as noted in HPI  ROS Reviewed:   ROS reviewed  Active Problems    1  Pain, dental (525 9) (E65 43)    Current Meds   1  Levothyroxine Sodium 150 MCG CAPS; Therapy: (Recorded:14Sep2017) to Recorded   2  Lexapro 5 MG Oral Tablet; Therapy: (Recorded:14Sep2017) to Recorded   3  Lidocaine Viscous 2 % Mouth/Throat Solution; USE AS DIRECTED; Therapy: 14Sep2017 to (Last Rx:14Sep2017)  Requested for: 14Sep2017 Ordered   4  Xanax 0 5 MG Oral Tablet; Therapy: (Recorded:14Sep2017) to Recorded  Medication List Reviewed: The medication list was reviewed and updated today  Allergies    1  No Known Drug Allergies    Vitals  Signs   Recorded: [id number] 07:40PM   Temperature: 97 6 F  Heart Rate: 70  Respiration: 16  Systolic: 798  Diastolic: 61  Height: 5 ft 5 in  Weight: 144 lb 6 oz  BMI Calculated: 24 03  BSA Calculated: 1 72  O2 Saturation: 95  Pain Scale: 8    Physical Exam   Constitutional  General appearance: No acute distress, well appearing and well nourished  Pulmonary  Respiratory effort: No increased work of breathing or signs of respiratory distress  Auscultation of lungs: Clear to auscultation  Cardiovascular  Auscultation of heart: Normal rate and rhythm, normal S1 and S2, without murmurs  Abdomen  Abdomen: Abnormal  -- TTP the upper and lower abdomen    Musculoskeletal  Gait and station: Normal    Digits and nails: Normal without clubbing or cyanosis  Inspection/palpation of joints, bones, and muscles: Abnormal  -- b/l flank pain, positive CVA tenderness  Psychiatric  Orientation to person, place, and time: Normal    Mood and affect: Normal    Additional Exam:  positive CVA tenderness b/l, no blood in urine at home and unable to give urine sample at this time, denies urinary discomfort and denies urinary complications  Signatures   Electronically signed by :  CHLOE Renae; Nov  3 2017 10:44AM EST                       (Author)    Electronically signed by : Gurpreet Garcia DO; Nov 15 2017  3:42PM EST                       (Co-author)

## 2018-01-01 ENCOUNTER — HOSPITAL ENCOUNTER (EMERGENCY)
Facility: HOSPITAL | Age: 68
Discharge: HOME | End: 2018-01-01
Attending: EMERGENCY MEDICINE | Admitting: EMERGENCY MEDICINE
Payer: MEDICARE

## 2018-01-01 ENCOUNTER — APPOINTMENT (EMERGENCY)
Dept: CT IMAGING | Facility: HOSPITAL | Age: 68
End: 2018-01-01
Payer: MEDICARE

## 2018-01-01 VITALS
DIASTOLIC BLOOD PRESSURE: 70 MMHG | OXYGEN SATURATION: 99 % | HEIGHT: 65 IN | RESPIRATION RATE: 20 BRPM | WEIGHT: 140 LBS | HEART RATE: 70 BPM | BODY MASS INDEX: 23.32 KG/M2 | TEMPERATURE: 100.4 F | SYSTOLIC BLOOD PRESSURE: 150 MMHG

## 2018-01-01 DIAGNOSIS — K52.9 COLITIS: Primary | ICD-10-CM

## 2018-01-01 LAB
ANION GAP SERPL CALCULATED.3IONS-SCNC: 8 MMOL/L (ref 4–13)
APTT PPP: 29 SECONDS (ref 23–35)
BASOPHILS # BLD AUTO: 0.02 THOUSANDS/ΜL (ref 0–0.1)
BASOPHILS NFR BLD AUTO: 0 % (ref 0–1)
BUN SERPL-MCNC: 8 MG/DL (ref 5–25)
CALCIUM SERPL-MCNC: 9 MG/DL (ref 8.3–10.1)
CHLORIDE SERPL-SCNC: 98 MMOL/L (ref 100–108)
CO2 SERPL-SCNC: 24 MMOL/L (ref 21–32)
CREAT SERPL-MCNC: 0.65 MG/DL (ref 0.6–1.3)
EOSINOPHIL # BLD AUTO: 0.05 THOUSAND/ΜL (ref 0–0.61)
EOSINOPHIL NFR BLD AUTO: 1 % (ref 0–6)
ERYTHROCYTE [DISTWIDTH] IN BLOOD BY AUTOMATED COUNT: 12.3 % (ref 11.6–15.1)
GFR SERPL CREATININE-BSD FRML MDRD: 92 ML/MIN/1.73SQ M
GLUCOSE SERPL-MCNC: 145 MG/DL (ref 65–140)
HCT VFR BLD AUTO: 45.6 % (ref 34.8–46.1)
HGB BLD-MCNC: 15.8 G/DL (ref 11.5–15.4)
INR PPP: 0.95 (ref 0.86–1.16)
LIPASE SERPL-CCNC: 93 U/L (ref 73–393)
LYMPHOCYTES # BLD AUTO: 2.22 THOUSANDS/ΜL (ref 0.6–4.47)
LYMPHOCYTES NFR BLD AUTO: 22 % (ref 14–44)
MCH RBC QN AUTO: 31 PG (ref 26.8–34.3)
MCHC RBC AUTO-ENTMCNC: 34.6 G/DL (ref 31.4–37.4)
MCV RBC AUTO: 89 FL (ref 82–98)
MONOCYTES # BLD AUTO: 0.93 THOUSAND/ΜL (ref 0.17–1.22)
MONOCYTES NFR BLD AUTO: 9 % (ref 4–12)
NEUTROPHILS # BLD AUTO: 7.07 THOUSANDS/ΜL (ref 1.85–7.62)
NEUTS SEG NFR BLD AUTO: 68 % (ref 43–75)
PLATELET # BLD AUTO: 222 THOUSANDS/UL (ref 149–390)
PMV BLD AUTO: 11 FL (ref 8.9–12.7)
POTASSIUM SERPL-SCNC: 4.4 MMOL/L (ref 3.5–5.3)
PROTHROMBIN TIME: 12.5 SECONDS (ref 12.1–14.4)
RBC # BLD AUTO: 5.1 MILLION/UL (ref 3.81–5.12)
SODIUM SERPL-SCNC: 130 MMOL/L (ref 136–145)
WBC # BLD AUTO: 10.29 THOUSAND/UL (ref 4.31–10.16)

## 2018-01-01 PROCEDURE — 85730 THROMBOPLASTIN TIME PARTIAL: CPT | Performed by: EMERGENCY MEDICINE

## 2018-01-01 PROCEDURE — 83690 ASSAY OF LIPASE: CPT | Performed by: EMERGENCY MEDICINE

## 2018-01-01 PROCEDURE — 85025 COMPLETE CBC W/AUTO DIFF WBC: CPT | Performed by: EMERGENCY MEDICINE

## 2018-01-01 PROCEDURE — 96375 TX/PRO/DX INJ NEW DRUG ADDON: CPT

## 2018-01-01 PROCEDURE — 96374 THER/PROPH/DIAG INJ IV PUSH: CPT

## 2018-01-01 PROCEDURE — 85610 PROTHROMBIN TIME: CPT | Performed by: EMERGENCY MEDICINE

## 2018-01-01 PROCEDURE — 74177 CT ABD & PELVIS W/CONTRAST: CPT

## 2018-01-01 PROCEDURE — 99284 EMERGENCY DEPT VISIT MOD MDM: CPT

## 2018-01-01 PROCEDURE — 82272 OCCULT BLD FECES 1-3 TESTS: CPT

## 2018-01-01 PROCEDURE — 36415 COLL VENOUS BLD VENIPUNCTURE: CPT | Performed by: EMERGENCY MEDICINE

## 2018-01-01 PROCEDURE — 80048 BASIC METABOLIC PNL TOTAL CA: CPT | Performed by: EMERGENCY MEDICINE

## 2018-01-01 PROCEDURE — 96361 HYDRATE IV INFUSION ADD-ON: CPT

## 2018-01-01 RX ORDER — ONDANSETRON 2 MG/ML
4 INJECTION INTRAMUSCULAR; INTRAVENOUS ONCE
Status: COMPLETED | OUTPATIENT
Start: 2018-01-01 | End: 2018-01-01

## 2018-01-01 RX ORDER — MORPHINE SULFATE 4 MG/ML
4 INJECTION, SOLUTION INTRAMUSCULAR; INTRAVENOUS ONCE
Status: COMPLETED | OUTPATIENT
Start: 2018-01-01 | End: 2018-01-01

## 2018-01-01 RX ORDER — DICYCLOMINE HCL 20 MG
20 TABLET ORAL EVERY 6 HOURS
Qty: 20 TABLET | Refills: 0 | Status: SHIPPED | OUTPATIENT
Start: 2018-01-01

## 2018-01-01 RX ORDER — LEVOFLOXACIN 500 MG/1
500 TABLET, FILM COATED ORAL DAILY
Qty: 10 TABLET | Refills: 0 | Status: SHIPPED | OUTPATIENT
Start: 2018-01-01 | End: 2018-01-11

## 2018-01-01 RX ADMIN — MORPHINE SULFATE 4 MG: 4 INJECTION, SOLUTION INTRAMUSCULAR; INTRAVENOUS at 05:51

## 2018-01-01 RX ADMIN — IOHEXOL 50 ML: 240 INJECTION, SOLUTION INTRATHECAL; INTRAVASCULAR; INTRAVENOUS; ORAL at 07:50

## 2018-01-01 RX ADMIN — SODIUM CHLORIDE 1000 ML: 0.9 INJECTION, SOLUTION INTRAVENOUS at 05:46

## 2018-01-01 RX ADMIN — IOHEXOL 100 ML: 350 INJECTION, SOLUTION INTRAVENOUS at 07:50

## 2018-01-01 RX ADMIN — ONDANSETRON 4 MG: 2 INJECTION INTRAMUSCULAR; INTRAVENOUS at 05:48

## 2018-01-01 NOTE — DISCHARGE INSTRUCTIONS
Colitis   WHAT YOU NEED TO KNOW:   Colitis is swelling and irritation of your colon  Colitis may be caused by ulcers or a problem with your immune system  Bacteria, a virus, or a parasite may also cause colitis  The cause may not be known  You may have diarrhea, abdominal pain, fever, or blood or mucus in your bowel movement  DISCHARGE INSTRUCTIONS:   Return to the emergency department if:   · You have sudden trouble breathing  · Your bowel movements are black or have blood in them  · You have blood in your vomit  · You have severe abdominal pain or your abdomen is swollen and feels hard  · You have any of the following signs of dehydration:     ¨ Dizziness or weakness    ¨ Dry mouth, cracked lips, or severe thirst    ¨ Fast heartbeat or breathing    ¨ Urinating very little or not at all  Contact your healthcare provider if:   · Your symptoms get worse or do not go away  · You have a fever, chills, cough, or feel weak and achy  · You suddenly lose weight without trying  · You have questions or concerns about your condition or care  Medicines:   · Medicines  may be given to decrease inflammation in your colon and treat diarrhea  · Take your medicine as directed  Contact your healthcare provider if you think your medicine is not helping or if you have side effects  Tell him of her if you are allergic to any medicine  Keep a list of the medicines, vitamins, and herbs you take  Include the amounts, and when and why you take them  Bring the list or the pill bottles to follow-up visits  Carry your medicine list with you in case of an emergency  Manage your symptoms:   · Drink liquids as directed  to help prevent dehydration  Good liquids to drink include water, juice, and broth  Ask how much liquid to drink each day  You may need to drink an oral rehydration solution (ORS)  An ORS contains a balance of water, salt, and sugar to replace body fluids lost during diarrhea       · Eat a variety of healthy foods  Healthy foods include fruits, vegetables, whole-grain breads, beans, low-fat dairy products, lean meats, and fish  You may need to eat several small meals throughout the day instead of large meals  Avoid spicy foods, caffeine, chocolate, and foods high in fat  · Talk to your healthcare provider before you take NSAIDs  NSAIDs can cause worsen your symptoms if ulcers are causing your colitis  · Start to exercise when you feel better  Regular exercise helps your bowels work normally  Ask about the best exercise plan for you  Follow up with your healthcare provider as directed: You may need to return for a colonoscopy or other tests  Write down how often you have a bowel movements and what they look like  Bring this to your follow-up visits  Write down your questions so you remember to ask them during your visits  © 2017 [employer] Information is for End User's use only and may not be sold, redistributed or otherwise used for commercial purposes  All illustrations and images included in CareNotes® are the copyrighted property of [employer]  or [employer]  The above information is an  only  It is not intended as medical advice for individual conditions or treatments  Talk to your doctor, nurse or pharmacist before following any medical regimen to see if it is safe and effective for you    Levaquin once a day, Bentyl for abdominal pain, Call [facility] in AM for follow-up

## 2018-01-01 NOTE — ED NOTES
Pt reports bright red blood in toilet bowl and on toilet paper x1 at approx 2400       Leida Adam RN  01/01/18 7075

## 2018-01-01 NOTE — ED PROVIDER NOTES
History  Chief Complaint   Patient presents with    Abdominal Pain     Presents with C/O lower abd pain/cramps, nausea, diarrhea, started yesterday afternoon  Denies dysuria, no fevers  Abdominal Pain       Prior to Admission Medications   Prescriptions Last Dose Informant Patient Reported? Taking? ALPRAZolam (XANAX) 0 25 mg tablet   Yes No   Sig: Take 0 5 mg by mouth 3 (three) times a day     Ascorbic Acid (VITAMIN C) 1000 MG tablet   Yes No   Sig: Take 1,000 mg by mouth daily   B Complex Vitamins (VITAMIN B COMPLEX PO)   Yes No   Sig: Take by mouth   CALCIUM PO   Yes No   Sig: Take by mouth   VITAMIN E PO   Yes No   Sig: Take by mouth   escitalopram (LEXAPRO) 10 mg tablet   Yes No   Sig: Take 10 mg by mouth daily  levothyroxine 75 mcg tablet   Yes No   Sig: Take 75 mcg by mouth daily     zolpidem (AMBIEN) 10 mg tablet   Yes No   Sig: Take 10 mg by mouth daily at bedtime as needed for sleep      Facility-Administered Medications: None       Past Medical History:   Diagnosis Date    Anxiety     Cancer [facility]) 2012    right sided breast lumpectomy with 3 lymph nodes removed    Depression     Disease of thyroid gland     h/o hyperthyroidism    Former heavy tobacco smoker     quit 2017    H/O radioactive iodine thyroid ablation     History of pneumonia     History of transfusion     Infectious viral hepatitis     HEP C +, treated x2 stopped b/c reaction, levels checked regularly, pt reports,in the '80's had many transfusions for low platelets    Irritable bowel syndrome        Past Surgical History:   Procedure Laterality Date    BREAST LUMPECTOMY Right     w/SNL done @ [place],  s/p RTX     BUNIONECTOMY Right 11/13/2017    Procedure: Katharine Addison;  Surgeon: Antoni Cai DPM;  Location: [facility] OR;  Service: Podiatry    CHOLECYSTECTOMY      MULTIPLE TOOTH EXTRACTIONS      IN FUSION BIG TOE,MT-P JT Right 2/13/2017    Procedure: FUSION GREAT TOE AND PLATE;  Surgeon: Skyla James Tami Vargas DPM;  Location:  MAIN OR;  Service: Podiatry    [place] REPAIR OF Florian Esquivel Right 11/13/2017    Procedure: FOOT 2, 3 HAMMERTOE REPAIR;  Surgeon: Dot Etienne DPM;  Location:  MAIN OR;  Service: Podiatry    VAGINAL DELIVERY      X 2       Family History   Problem Relation Age of Onset    Diabetes Mother     Heart disease Mother     Heart disease Father     Diabetes Brother     Heart disease Brother      I have reviewed and agree with the history as documented  Social History   Substance Use Topics    Smoking status: Former Smoker     Packs/day: 0 50     Years: 46 00     Types: Cigarettes     Quit date: 2017    Smokeless tobacco: Never Used    Alcohol use Yes      Comment: rarely        Review of Systems   Gastrointestinal: Positive for abdominal pain         Physical Exam  ED Triage Vitals   Temperature Pulse Respirations Blood Pressure SpO2   01/01/18 0506 01/01/18 0506 01/01/18 0506 01/01/18 0504 01/01/18 0506   98 3 °F (36 8 °C) 74 (!) 24 148/70 98 %      Temp Source Heart Rate Source Patient Position - Orthostatic VS BP Location FiO2 (%)   01/01/18 0506 01/01/18 0506 01/01/18 0506 01/01/18 0506 --   Temporal Monitor Lying Left arm       Pain Score       01/01/18 0506       Worst Possible Pain           Orthostatic Vital Signs  Vitals:    01/01/18 0700 01/01/18 0715 01/01/18 0730 01/01/18 0901   BP: 153/76  159/74 150/70   Pulse: 65 65 67 70   Patient Position - Orthostatic VS: Lying   Sitting       Physical Exam    ED Medications  Medications   sodium chloride 0 9 % bolus 1,000 mL (0 mL Intravenous Stopped 1/1/18 0755)   morphine (PF) 4 mg/mL injection 4 mg (4 mg Intravenous Given 1/1/18 0551)   ondansetron (ZOFRAN) injection 4 mg (4 mg Intravenous Given 1/1/18 0548)   iohexol (OMNIPAQUE) 240 MG/ML solution 50 mL (50 mL Oral Given 1/1/18 0750)   iohexol (OMNIPAQUE) 350 MG/ML injection (SINGLE-DOSE) 100 mL (100 mL Intravenous Given 1/1/18 0750)       Diagnostic Studies  Results Reviewed     Procedure Component Value Units Date/Time    Basic metabolic panel [[id number]]  (Abnormal) Collected:  01/01/18 0515    Lab Status:  Final result Specimen:  Blood from Line, Venous Updated:  01/01/18 8256     Sodium 130 (L) mmol/L      Potassium 4 4 mmol/L      Chloride 98 (L) mmol/L      CO2 24 mmol/L      Anion Gap 8 mmol/L      BUN 8 mg/dL      Creatinine 0 65 mg/dL      Glucose 145 (H) mg/dL      Calcium 9 0 mg/dL      eGFR 92 ml/min/1 73sq m     Narrative:         National Kidney Disease Education Program recommendations are as follows:  GFR calculation is accurate only with a steady state creatinine  Chronic Kidney disease less than 60 ml/min/1 73 sq  meters  Kidney failure less than 15 ml/min/1 73 sq  meters  Lipase [[id number]]  (Normal) Collected:  01/01/18 0515    Lab Status:  Final result Specimen:  Blood from Line, Venous Updated:  01/01/18 0605     Lipase 93 u/L     Protime-INR [[id number]]  (Normal) Collected:  01/01/18 0515    Lab Status:  Final result Specimen:  Blood from Arm, Left Updated:  01/01/18 0605     Protime 12 5 seconds      INR 0 95    APTT [[id number]]  (Normal) Collected:  01/01/18 0515    Lab Status:  Final result Specimen:  Blood from Arm, Left Updated:  01/01/18 0605     PTT 29 seconds     Narrative:          Therapeutic Heparin Range = 60-90 seconds    CBC and differential [[id number]]  (Abnormal) Collected:  01/01/18 0515    Lab Status:  Final result Specimen:  Blood from Line, Venous Updated:  01/01/18 0552     WBC 10 29 (H) Thousand/uL      RBC 5 10 Million/uL      Hemoglobin 15 8 (H) g/dL      Hematocrit 45 6 %      MCV 89 fL      MCH 31 0 pg      MCHC 34 6 g/dL      RDW 12 3 %      MPV 11 0 fL      Platelets 455 Thousands/uL      Neutrophils Relative 68 %      Lymphocytes Relative 22 %      Monocytes Relative 9 %      Eosinophils Relative 1 %      Basophils Relative 0 %      Neutrophils Absolute 7 07 Thousands/µL      Lymphocytes Absolute 2 22 Thousands/µL      Monocytes Absolute 0 93 Thousand/µL      Eosinophils Absolute 0 05 Thousand/µL      Basophils Absolute 0 02 Thousands/µL                  CT abdomen pelvis with contrast   Final Result by Junaid Hutton MD (00/41 0566)         1  Findings compatible with colitis involving the transverse and descending colon  Findings may be related to inflammatory bowel disease or infection  2  No bowel obstruction  No extraluminal abscess  3  Pneumobilia, likely related to prior cholecystectomy/biliary tract intervention   4  Patchy focus of airspace disease, right lower lobe, suggesting atelectasis or small infiltrate         Workstation performed: [id number]                    Procedures  Procedures       Phone Contacts  ED Phone Contact    ED Course  ED Course                                MDM  Number of Diagnoses or Management Options  Colitis: new and requires workup     Amount and/or Complexity of Data Reviewed  Clinical lab tests: ordered and reviewed  Tests in the radiology section of CPT®: ordered and reviewed    Patient Progress  Patient progress: improved    CritCare Time    Disposition  Final diagnoses:   Colitis     Time reflects when diagnosis was documented in both MDM as applicable and the Disposition within this note     Time User Action Codes Description Comment    1/1/2018  8:47 AM Catrina Luzo Add [K52 9] Colitis       ED Disposition     ED Disposition Condition Comment    Discharge  [facility] discharge to home/self care      Condition at discharge: Stable        Follow-up Information     Follow up With Specialties Details Why Contact Info    Michael Dean MD Gastroenterology Call in 1 day for follow-up [address] [facility]  [phone]          Patient's Medications   Discharge Prescriptions    DICYCLOMINE (BENTYL) 20 MG TABLET    Take 1 tablet by mouth every 6 (six) hours As needed for abdominal cramps       Start Date: 1/1/2018  End Date: --       Order Dose: 20 mg Quantity: 20 tablet    Refills: 0    LEVOFLOXACIN (LEVAQUIN) 500 MG TABLET    Take 1 tablet by mouth daily for 10 days       Start Date: 1/1/2018  End Date: 1/11/2018       Order Dose: 500 mg       Quantity: 10 tablet    Refills: 0     No discharge procedures on file      ED Provider  Electronically Signed by           Bobby Gregorio MD  01/01/18 5823

## 2018-01-01 NOTE — ED CARE HANDOFF
Emergency Department Sign Out Note        Sign out and transfer of care from Dr Aly Able  See Separate Emergency Department note  The patient, Shlomo Apley, was evaluated by the previous provider for ab dominal pain  Workup Completed:  labs    ED Course / Workup Pending (followup):  CT scan                          ED Course      Procedures  [facility]  CritCare Time      Disposition  Final diagnoses:   None     ED Disposition     None      Follow-up Information    None       Patient's Medications   Discharge Prescriptions    No medications on file     No discharge procedures on file         ED Provider  Electronically Signed by

## 2018-01-01 NOTE — ED NOTES
Rad Tech here, starting pt on oral contrast for CT Scan prep        Lyudmila Varghese RN  01/01/18 1510

## 2018-01-01 NOTE — ED NOTES
All results reviewed with patient by MD  Patient to be discharged calling for a ride home       Skyler Romero RN  01/01/18 0787

## 2018-01-01 NOTE — ED PROVIDER NOTES
History  Chief Complaint   Patient presents with    Abdominal Pain     Presents with C/O lower abd pain/cramps, nausea, diarrhea, started yesterday afternoon  Denies dysuria, no fevers  This 59-year-old female status post breast cancer with lumpectomy and radiation therapy in remission for 5 years complains of abdominal pain and bloody stool  She states that since yesterday morning she had migratory intermittent colicky abdominal pain  Later in the evening about  6:00 p m  she started having nausea  She states pain then localized to the left lower quadrant  This is constant and there is no exacerbating or alleviating factor  She has had diarrhea and some bloody stool overnight  She never had this before  She denies recent fever or trauma  She was on Augmentin last week for sore throat  Patient denies dysuria lightheadedness chest pain dyspnea and syncope  Patient is status post cholecystectomy but has had no other abdominal surgery and denies prior colonoscopy She states she takes Bentyl for irritable bowel syndrome sometimes  She says she has hemorrhoids but they never bled like this            Prior to Admission Medications   Prescriptions Last Dose Informant Patient Reported? Taking? ALPRAZolam (XANAX) 0 25 mg tablet   Yes No   Sig: Take 0 5 mg by mouth 3 (three) times a day     Ascorbic Acid (VITAMIN C) 1000 MG tablet   Yes No   Sig: Take 1,000 mg by mouth daily   B Complex Vitamins (VITAMIN B COMPLEX PO)   Yes No   Sig: Take by mouth   CALCIUM PO   Yes No   Sig: Take by mouth   VITAMIN E PO   Yes No   Sig: Take by mouth   escitalopram (LEXAPRO) 10 mg tablet   Yes No   Sig: Take 10 mg by mouth daily  levothyroxine 75 mcg tablet   Yes No   Sig: Take 75 mcg by mouth daily     zolpidem (AMBIEN) 10 mg tablet   Yes No   Sig: Take 10 mg by mouth daily at bedtime as needed for sleep      Facility-Administered Medications: None       Past Medical History:   Diagnosis Date    Anxiety     Cancer [facility]) 2012    right sided breast lumpectomy with 3 lymph nodes removed    Depression     Disease of thyroid gland     h/o hyperthyroidism    Former heavy tobacco smoker     quit 2017    H/O radioactive iodine thyroid ablation     History of pneumonia     History of transfusion     Infectious viral hepatitis     HEP C +, treated x2 stopped b/c reaction, levels checked regularly, pt reports,in the '80's had many transfusions for low platelets    Irritable bowel syndrome        Past Surgical History:   Procedure Laterality Date    BREAST LUMPECTOMY Right     w/SNL done @ [place],  s/p RTX     BUNIONECTOMY Right 11/13/2017    Procedure: Pool Brandt;  Surgeon: Rashad Beltran DPM;  [facility];  Service: Podiatry    CHOLECYSTECTOMY      MULTIPLE TOOTH EXTRACTIONS      MO FUSION BIG TOE,MT-P JT Right 2/13/2017    Procedure: FUSION GREAT TOE AND PLATE;  Surgeon: Rashad Beltran DPM;  [facility];  Service: Podiatry    MO REPAIR OF Elda Push Right 11/13/2017    Procedure: FOOT 2, 3 HAMMERTOE REPAIR;  Surgeon: Rashad Beltran DPM;  [facility];  Service: Podiatry    VAGINAL DELIVERY      X 2       Family History   Problem Relation Age of Onset    Diabetes Mother     Heart disease Mother     Heart disease Father     Diabetes Brother     Heart disease Brother      I have reviewed and agree with the history as documented  Social History   Substance Use Topics    Smoking status: Former Smoker     Packs/day: 0 50     Years: 46 00     Types: Cigarettes     Quit date: 2017    Smokeless tobacco: Never Used    Alcohol use Yes      Comment: rarely        Review of Systems   Constitutional: Negative  HENT: Negative  Eyes: Negative  Respiratory: Negative  Cardiovascular: Negative  Gastrointestinal: Negative  Abdominal pain:   Intermittent colicky abdominal pain thought to be a irritable bowel syndrome   Diarrhea:  occasional loose stool thought to be related to irritable bowel syndrome  See HPI   Endocrine: Negative  Genitourinary: Negative  Musculoskeletal: Negative  Skin: Negative  Allergic/Immunologic: Negative  Neurological: Negative  Hematological: Negative  Psychiatric/Behavioral: Negative  All other systems reviewed and are negative  Physical Exam  ED Triage Vitals   Temperature Pulse Respirations Blood Pressure SpO2   01/01/18 0506 01/01/18 0506 01/01/18 0506 01/01/18 0504 01/01/18 0506   98 3 °F (36 8 °C) 74 (!) 24 148/70 98 %      Temp Source Heart Rate Source Patient Position - Orthostatic VS BP Location FiO2 (%)   01/01/18 0506 01/01/18 0506 01/01/18 0506 01/01/18 0506 --   Temporal Monitor Lying Left arm       Pain Score       01/01/18 0506       Worst Possible Pain           Orthostatic Vital Signs  Vitals:    01/01/18 0615 01/01/18 0630 01/01/18 0645 01/01/18 0700   BP:  146/73  153/76   Pulse: 65 65 64 65   Patient Position - Orthostatic VS:    Lying       Physical Exam   Constitutional: She is oriented to person, place, and time  She appears well-developed and well-nourished  HENT:   Head: Normocephalic and atraumatic  Eyes: Conjunctivae and EOM are normal  Pupils are equal, round, and reactive to light  Neck: Normal range of motion  Neck supple  No JVD present  Cardiovascular: Normal rate and regular rhythm  No murmur heard  Pulmonary/Chest: Effort normal and breath sounds normal  No stridor  Abdominal: Soft  Bowel sounds are normal  She exhibits no distension and no mass  Tenderness:  left lower quadrant tenderness  There is no rebound and no guarding  No hernia  Genitourinary: Rectal exam shows guaiac positive stool  Genitourinary Comments: Small external hemorrhoids without bleeding or thrombosis  No mass detected in the rectal vault  There is minimal stool which was heme-positive  Normal rectal tone  Musculoskeletal: Normal range of motion  She exhibits no edema     Neurological: She is alert and oriented to person, place, and time  She has normal reflexes  No cranial nerve deficit  Coordination normal    Skin: Skin is warm and dry  No rash noted  Psychiatric: She has a normal mood and affect  Nursing note and vitals reviewed  ED Medications  Medications   sodium chloride 0 9 % bolus 1,000 mL (1,000 mL Intravenous New Bag 1/1/18 0546)   morphine (PF) 4 mg/mL injection 4 mg (4 mg Intravenous Given 1/1/18 0551)   ondansetron (ZOFRAN) injection 4 mg (4 mg Intravenous Given 1/1/18 0548)       Diagnostic Studies  Results Reviewed     Procedure Component Value Units Date/Time    Basic metabolic panel [[id number]]  (Abnormal) Collected:  01/01/18 0515    Lab Status:  Final result Specimen:  Blood from Line, Venous Updated:  01/01/18 4128     Sodium 130 (L) mmol/L      Potassium 4 4 mmol/L      Chloride 98 (L) mmol/L      CO2 24 mmol/L      Anion Gap 8 mmol/L      BUN 8 mg/dL      Creatinine 0 65 mg/dL      Glucose 145 (H) mg/dL      Calcium 9 0 mg/dL      eGFR 92 ml/min/1 73sq m     Narrative:         National Kidney Disease Education Program recommendations are as follows:  GFR calculation is accurate only with a steady state creatinine  Chronic Kidney disease less than 60 ml/min/1 73 sq  meters  Kidney failure less than 15 ml/min/1 73 sq  meters  Lipase [[id number]]  (Normal) Collected:  01/01/18 0515    Lab Status:  Final result Specimen:  Blood from Line, Venous Updated:  01/01/18 0605     Lipase 93 u/L     Protime-INR [[id number]]  (Normal) Collected:  01/01/18 0515    Lab Status:  Final result Specimen:  Blood from Arm, Left Updated:  01/01/18 0605     Protime 12 5 seconds      INR 0 95    APTT [[id number]]  (Normal) Collected:  01/01/18 0515    Lab Status:  Final result Specimen:  Blood from Arm, Left Updated:  01/01/18 0605     PTT 29 seconds     Narrative:          Therapeutic Heparin Range = 60-90 seconds    CBC and differential [[id number]]  (Abnormal) Collected:  01/01/18 0515    Lab Status:  Final result Specimen:  Blood from Line, Venous Updated:  01/01/18 0552     WBC 10 29 (H) Thousand/uL      RBC 5 10 Million/uL      Hemoglobin 15 8 (H) g/dL      Hematocrit 45 6 %      MCV 89 fL      MCH 31 0 pg      MCHC 34 6 g/dL      RDW 12 3 %      MPV 11 0 fL      Platelets 366 Thousands/uL      Neutrophils Relative 68 %      Lymphocytes Relative 22 %      Monocytes Relative 9 %      Eosinophils Relative 1 %      Basophils Relative 0 %      Neutrophils Absolute 7 07 Thousands/µL      Lymphocytes Absolute 2 22 Thousands/µL      Monocytes Absolute 0 93 Thousand/µL      Eosinophils Absolute 0 05 Thousand/µL      Basophils Absolute 0 02 Thousands/µL                  CT abdomen pelvis with contrast    (Results Pending)              Procedures  Procedures       Phone Contacts  ED Phone Contact    ED Course  ED Course                                MDM  Number of Diagnoses or Management Options  Diagnosis management comments:  Left lower quadrant abdominal pain and bloody diarrhea without acute diverticulitis, colitis or inflammatory bowel disease  Patient awaiting labs and imaging  Case signed out to Dr Eri Kumar with since 7:00 a m  Amount and/or Complexity of Data Reviewed  Clinical lab tests: ordered and reviewed  Tests in the radiology section of CPT®: ordered  Discuss the patient with other providers: yes      CritCare Time    Disposition  Final diagnoses:   None     ED Disposition     None      Follow-up Information    None       Patient's Medications   Discharge Prescriptions    No medications on file     No discharge procedures on file      ED Provider  Electronically Signed by           Adrian Schwartz DO  01/01/18 9255

## 2018-10-18 ENCOUNTER — TRANSCRIBE ORDERS (OUTPATIENT)
Dept: ADMINISTRATIVE | Facility: HOSPITAL | Age: 68
End: 2018-10-18

## 2018-10-18 ENCOUNTER — APPOINTMENT (OUTPATIENT)
Dept: RADIOLOGY | Facility: CLINIC | Age: 68
End: 2018-10-18
Payer: MEDICARE

## 2018-10-18 DIAGNOSIS — M79.672 LEFT FOOT PAIN: Primary | ICD-10-CM

## 2018-10-18 DIAGNOSIS — M79.672 LEFT FOOT PAIN: ICD-10-CM

## 2018-10-18 PROCEDURE — 73630 X-RAY EXAM OF FOOT: CPT

## 2019-02-26 ENCOUNTER — OFFICE VISIT (OUTPATIENT)
Dept: URGENT CARE | Facility: CLINIC | Age: 69
End: 2019-02-26
Payer: MEDICARE

## 2019-02-26 VITALS
HEART RATE: 82 BPM | RESPIRATION RATE: 20 BRPM | TEMPERATURE: 100.1 F | DIASTOLIC BLOOD PRESSURE: 82 MMHG | WEIGHT: 145.8 LBS | HEIGHT: 65 IN | OXYGEN SATURATION: 95 % | SYSTOLIC BLOOD PRESSURE: 148 MMHG | BODY MASS INDEX: 24.29 KG/M2

## 2019-02-26 DIAGNOSIS — J20.9 ACUTE BRONCHITIS, UNSPECIFIED ORGANISM: Primary | ICD-10-CM

## 2019-02-26 PROCEDURE — 99213 OFFICE O/P EST LOW 20 MIN: CPT | Performed by: PREVENTIVE MEDICINE

## 2019-02-26 PROCEDURE — G0463 HOSPITAL OUTPT CLINIC VISIT: HCPCS | Performed by: PREVENTIVE MEDICINE

## 2019-02-26 RX ORDER — LEVOTHYROXINE SODIUM 0.12 MG/1
TABLET ORAL
Refills: 1 | COMMUNITY
Start: 2019-01-08

## 2019-02-26 RX ORDER — PROMETHAZINE HYDROCHLORIDE AND CODEINE PHOSPHATE 6.25; 1 MG/5ML; MG/5ML
5 SYRUP ORAL EVERY 4 HOURS PRN
Qty: 120 ML | Refills: 0 | Status: SHIPPED | OUTPATIENT
Start: 2019-02-26

## 2019-02-26 RX ORDER — ALPRAZOLAM 0.5 MG/1
TABLET ORAL
COMMUNITY
Start: 2019-02-13 | End: 2019-02-26

## 2019-02-26 RX ORDER — ESCITALOPRAM OXALATE 20 MG/1
20 TABLET ORAL DAILY
Refills: 1 | COMMUNITY
Start: 2019-01-10

## 2019-02-26 RX ORDER — AZITHROMYCIN 250 MG/1
TABLET, FILM COATED ORAL
Qty: 6 TABLET | Refills: 0 | Status: SHIPPED | OUTPATIENT
Start: 2019-02-26 | End: 2019-03-02

## 2019-02-26 RX ORDER — ACETAMINOPHEN AND CODEINE PHOSPHATE 300; 30 MG/1; MG/1
TABLET ORAL
COMMUNITY
Start: 2019-02-17

## 2019-02-26 RX ORDER — MONTELUKAST SODIUM 10 MG/1
TABLET ORAL
COMMUNITY
Start: 2019-02-26

## 2019-02-26 NOTE — PATIENT INSTRUCTIONS
Increase fluids  Tylenol or Motrin for fever  If you're not feeling better in 2 days you may start antibiotic in use at all  If not improved in 3 or 4 days please get recheck

## 2019-02-26 NOTE — PROGRESS NOTES
[facility]        NAME: Timi Bowers is a 71 y o  female  : 1950    MRN: [id number]  DATE: 2019  TIME: 6:37 PM    Assessment and Plan   Acute bronchitis, unspecified organism [J20 9]  1  Acute bronchitis, unspecified organism  promethazine-codeine (PHENERGAN WITH CODEINE) 6 25-10 mg/5 mL syrup    azithromycin (ZITHROMAX) 250 mg tablet         Patient Instructions       Follow up with PCP in 3-5 days  Proceed to  ER if symptoms worsen  Chief Complaint     Chief Complaint   Patient presents with    Cough     patient reports she started yesterday with cough, sore throat,chest congestion,chills and runny nose  taking Tylenol last dose at 4pm today  History of Present Illness       Was in [place] last week and flu home on   On Monday she began to feel feverish body aches increased cough productive of mild whitish yellow sputum  She did have a flu shot  She has been a heavy smoker but is basically quit these last several months  Review of Systems   Review of Systems   Constitutional: Positive for fatigue and fever  Respiratory: Positive for cough  Current Medications       Current Outpatient Medications:     acetaminophen-codeine (TYLENOL #3) 300-30 mg per tablet, , Disp: , Rfl:     ALPRAZolam (XANAX) 0 25 mg tablet, Take 0 5 mg by mouth 3 (three) times a day  , Disp: , Rfl:     Ascorbic Acid (VITAMIN C) 1000 MG tablet, Take 1,000 mg by mouth daily, Disp: , Rfl:     B Complex Vitamins (VITAMIN B COMPLEX PO), Take by mouth, Disp: , Rfl:     CALCIUM PO, Take by mouth, Disp: , Rfl:     dicyclomine (BENTYL) 20 mg tablet, Take 1 tablet by mouth every 6 (six) hours As needed for abdominal cramps, Disp: 20 tablet, Rfl: 0    escitalopram (LEXAPRO) 10 mg tablet, Take 10 mg by mouth daily  , Disp: , Rfl:     escitalopram (LEXAPRO) 20 mg tablet, Take 20 mg by mouth daily, Disp: , Rfl: 1    levothyroxine 125 mcg tablet, TAKE 1 TABLET EVERY MORNING ON EMPTY STOMACH DAILY ORALLY, Disp: , Rfl: 1    montelukast (SINGULAIR) 10 mg tablet, , Disp: , Rfl:     RESTASIS MULTIDOSE 0 05 % ophthalmic emulsion, INSTILL 1 DROP INTO BOTH EYES TWICE A DAY, Disp: , Rfl: 3    VITAMIN E PO, Take by mouth, Disp: , Rfl:     zolpidem (AMBIEN) 10 mg tablet, Take 10 mg by mouth daily at bedtime as needed for sleep, Disp: , Rfl:     azithromycin (ZITHROMAX) 250 mg tablet, Take 2 tablets today then 1 tablet daily x 4 days, Disp: 6 tablet, Rfl: 0    levothyroxine 75 mcg tablet, Take 75 mcg by mouth daily  , Disp: , Rfl:     promethazine-codeine (PHENERGAN WITH CODEINE) 6 25-10 mg/5 mL syrup, Take 5 mL by mouth every 4 (four) hours as needed for cough, Disp: 120 mL, Rfl: 0    Current Allergies     Allergies as of 02/26/2019    (No Known Allergies)            The following portions of the patient's history were reviewed and updated as appropriate: allergies, current medications, past family history, past medical history, past social history, past surgical history and problem list      Past Medical History:   Diagnosis Date    Anxiety     Cancer [facility]) 2012    right sided breast lumpectomy with 3 lymph nodes removed    Depression     Disease of thyroid gland     h/o hyperthyroidism    Former heavy tobacco smoker     quit 2017    H/O radioactive iodine thyroid ablation     History of pneumonia     History of transfusion     Infectious viral hepatitis     HEP C +, treated x2 stopped b/c reaction, levels checked regularly, pt reports,in the '80's had many transfusions for low platelets    Irritable bowel syndrome        Past Surgical History:   Procedure Laterality Date    BREAST LUMPECTOMY Right     w/SNL done @ [place],  s/p RTX     BUNIONECTOMY Right 11/13/2017    Procedure: Haroon Mason;  Surgeon: Elyse Arthur DPM;  Location:  MAIN OR;  Service: Podiatry    CHOLECYSTECTOMY      MULTIPLE TOOTH EXTRACTIONS      AR FUSION BIG TOE,MT-P JT Right 2/13/2017 Procedure: FUSION GREAT TOE AND PLATE;  Surgeon: Dot Etienne DPM;  [facility];  Service: Podiatry    NE REPAIR OF Florian Chars Right 11/13/2017    Procedure: FOOT 2, 3 HAMMERTOE REPAIR;  Surgeon: Dot Etienne DPM;  [facility];  Service: Podiatry   [address]      X 2       Family History   Problem Relation Age of Onset    Diabetes Mother     Heart disease Mother     Heart disease Father     Diabetes Brother     Heart disease Brother          Medications have been verified  Objective   /82   Pulse 82   Temp 100 1 °F (37 8 °C)   Resp 20   Ht 5' 5" (1 651 m)   Wt 66 1 kg (145 lb 12 8 oz)   LMP  (LMP Unknown)   SpO2 95%   BMI 24 26 kg/m²        Physical Exam     Physical Exam   HENT:   Right Ear: External ear normal    Left Ear: External ear normal    Mouth/Throat: Oropharynx is clear and moist    Cardiovascular: Normal heart sounds  Pulmonary/Chest:   Generalized mildly decreased breath sounds throughout the lung fields  Very slight end inspiratory wheezes heard over the lower lobes no rales  Lymphadenopathy:     She has no cervical adenopathy

## 2019-03-01 ENCOUNTER — OFFICE VISIT (OUTPATIENT)
Dept: URGENT CARE | Facility: CLINIC | Age: 69
End: 2019-03-01
Payer: MEDICARE

## 2019-03-01 VITALS
HEIGHT: 65 IN | OXYGEN SATURATION: 95 % | WEIGHT: 146 LBS | DIASTOLIC BLOOD PRESSURE: 74 MMHG | BODY MASS INDEX: 24.32 KG/M2 | HEART RATE: 88 BPM | SYSTOLIC BLOOD PRESSURE: 120 MMHG | TEMPERATURE: 97.4 F | RESPIRATION RATE: 16 BRPM

## 2019-03-01 DIAGNOSIS — J20.9 ACUTE BRONCHITIS, UNSPECIFIED ORGANISM: Primary | ICD-10-CM

## 2019-03-01 PROCEDURE — 99213 OFFICE O/P EST LOW 20 MIN: CPT | Performed by: PHYSICIAN ASSISTANT

## 2019-03-01 PROCEDURE — G0463 HOSPITAL OUTPT CLINIC VISIT: HCPCS | Performed by: PHYSICIAN ASSISTANT

## 2019-03-01 RX ORDER — PREDNISONE 20 MG/1
20 TABLET ORAL DAILY
Qty: 5 TABLET | Refills: 0 | Status: SHIPPED | OUTPATIENT
Start: 2019-03-01 | End: 2019-03-06

## 2019-03-01 RX ORDER — DEXTROMETHORPHAN HYDROBROMIDE AND PROMETHAZINE HYDROCHLORIDE 15; 6.25 MG/5ML; MG/5ML
5 SYRUP ORAL 4 TIMES DAILY PRN
Qty: 118 ML | Refills: 0 | Status: SHIPPED | OUTPATIENT
Start: 2019-03-01 | End: 2019-03-14 | Stop reason: ALTCHOICE

## 2019-03-01 NOTE — PATIENT INSTRUCTIONS
No clear pneumonia noted on xray, will call if radiology report differs  Continue azithromycin  Prednisone 20 mg x 5 days  Use promethazine as needed for cough  Watch for fevers or shortness of breath  Follow up with your PCP for persistent symptoms  Go to the ER for any distress

## 2019-03-01 NOTE — PROGRESS NOTES
[facility]        NAME: Nicholas Stoner is a 71 y o  female  : 1950    MRN: [id number]  DATE: 2019  TIME: 1:50 PM    Assessment and Plan   Acute bronchitis, unspecified organism [J20 9]  1  Acute bronchitis, unspecified organism  XR chest pa & lateral    predniSONE 20 mg tablet    promethazine-dextromethorphan (PHENERGAN-DM) 6 25-15 mg/5 mL oral syrup         Patient Instructions     No clear pneumonia noted on xray, will call if radiology report differs  Continue azithromycin  Prednisone 20 mg x 5 days  Use promethazine as needed for cough  Watch for fevers or shortness of breath  Follow up with PCP in 3-5 days  Proceed to  ER if symptoms worsen  Chief Complaint     Chief Complaint   Patient presents with   Margie Mariaelenai Like Symptoms     Pt reports on Monday she developed a cough, body aches, and HA  She was see  and prescribed Azithromycin & phenergan w/ codeine  She reports her cough is not improving  History of Present Illness       This is a 71year old female presenting for cough x 5 days  Associated symptoms include sinus congestion, rhinorrhea, ear pain, and cough is productive of brown sputum  She was seen 3 days ago and prescribed azithromycin and promethazine with codeine  She states that she has taken 3 days of azithromycin so far  She had tmax 100 7, but fever and chills are resolved now  No shortness of breath, difficulty breathing, chest pain  She is requesting a refill on the promethazine with codeine  [place] PDMP checked, multiple prescriptions for ambien, xanax, tylenol 3, and promethazine with codeine  Current promethazine with codeine prescription should have lasted 6 days  Review of Systems   Review of Systems   Constitutional: Positive for fatigue  Negative for fever  HENT: Positive for congestion, ear pain, postnasal drip, rhinorrhea and sore throat  Negative for sinus pressure  Respiratory: Positive for cough and chest tightness   Negative for shortness of breath and wheezing  Cardiovascular: Negative for chest pain  Gastrointestinal: Negative for abdominal pain, diarrhea, nausea and vomiting  Skin: Negative for rash  Neurological: Negative for dizziness and headaches  Current Medications       Current Outpatient Medications:     acetaminophen-codeine (TYLENOL #3) 300-30 mg per tablet, , Disp: , Rfl:     ALPRAZolam (XANAX) 0 25 mg tablet, Take 0 5 mg by mouth 3 (three) times a day  , Disp: , Rfl:     Ascorbic Acid (VITAMIN C) 1000 MG tablet, Take 1,000 mg by mouth daily, Disp: , Rfl:     azithromycin (ZITHROMAX) 250 mg tablet, Take 2 tablets today then 1 tablet daily x 4 days, Disp: 6 tablet, Rfl: 0    B Complex Vitamins (VITAMIN B COMPLEX PO), Take by mouth, Disp: , Rfl:     CALCIUM PO, Take by mouth, Disp: , Rfl:     dicyclomine (BENTYL) 20 mg tablet, Take 1 tablet by mouth every 6 (six) hours As needed for abdominal cramps, Disp: 20 tablet, Rfl: 0    escitalopram (LEXAPRO) 20 mg tablet, Take 20 mg by mouth daily, Disp: , Rfl: 1    levothyroxine 125 mcg tablet, TAKE 1 TABLET EVERY MORNING ON EMPTY STOMACH DAILY ORALLY, Disp: , Rfl: 1    promethazine-codeine (PHENERGAN WITH CODEINE) 6 25-10 mg/5 mL syrup, Take 5 mL by mouth every 4 (four) hours as needed for cough, Disp: 120 mL, Rfl: 0    RESTASIS MULTIDOSE 0 05 % ophthalmic emulsion, INSTILL 1 DROP INTO BOTH EYES TWICE A DAY, Disp: , Rfl: 3    VITAMIN E PO, Take by mouth, Disp: , Rfl:     zolpidem (AMBIEN) 10 mg tablet, Take 10 mg by mouth daily at bedtime as needed for sleep, Disp: , Rfl:     escitalopram (LEXAPRO) 10 mg tablet, Take 10 mg by mouth daily  , Disp: , Rfl:     levothyroxine 75 mcg tablet, Take 75 mcg by mouth daily  , Disp: , Rfl:     montelukast (SINGULAIR) 10 mg tablet, , Disp: , Rfl:     predniSONE 20 mg tablet, Take 1 tablet (20 mg total) by mouth daily for 5 days, Disp: 5 tablet, Rfl: 0    promethazine-dextromethorphan (PHENERGAN-DM) 6 25-15 mg/5 mL oral syrup, Take 5 mL by mouth 4 (four) times a day as needed for cough, Disp: 118 mL, Rfl: 0    Current Allergies     Allergies as of 03/01/2019    (No Known Allergies)            The following portions of the patient's history were reviewed and updated as appropriate: allergies, current medications, past family history, past medical history, past social history, past surgical history and problem list      Past Medical History:   Diagnosis Date    Anxiety     Cancer [facility]) 2012    right sided breast lumpectomy with 3 lymph nodes removed    Depression     Disease of thyroid gland     h/o hyperthyroidism    Former heavy tobacco smoker     quit 2017    H/O radioactive iodine thyroid ablation     History of pneumonia     History of transfusion     Infectious viral hepatitis     HEP C +, treated x2 stopped b/c reaction, levels checked regularly, pt reports,in the '80's had many transfusions for low platelets    Irritable bowel syndrome        Past Surgical History:   Procedure Laterality Date    BREAST LUMPECTOMY Right     w/SNL done @ [place],  s/p RTX     BUNIONECTOMY Right 11/13/2017    Procedure: Haroon Mason;  Surgeon: Elyse Arthur DPM;  [facility];  Service: Podiatry    CHOLECYSTECTOMY      MULTIPLE TOOTH EXTRACTIONS      [place] FUSION BIG TOE,MT-P JT Right 2/13/2017    Procedure: FUSION GREAT TOE AND PLATE;  Surgeon: Elyse Arthur DPM;  [facility];  Service: Podiatry    ND REPAIR OF Jossy Tinsley Right 11/13/2017    Procedure: FOOT 2, 3 HAMMERTOE REPAIR;  Surgeon: Elyse Arthur DPM;  [facility];  Service: Podiatry    VAGINAL DELIVERY      X 2       Family History   Problem Relation Age of Onset    Diabetes Mother     Heart disease Mother     Heart disease Father     Diabetes Brother     Heart disease Brother          Medications have been verified          Objective   /74   Pulse 88   Temp (!) 97 4 °F (36 3 °C)   Resp 16   Ht 5' 5" (1 651 m)   Wt 66 2 kg (146 lb)   LMP  (LMP Unknown)   SpO2 95%   BMI 24 30 kg/m²        Physical Exam     Physical Exam   Constitutional: She appears well-developed and well-nourished  No distress  HENT:   Right Ear: Tympanic membrane, external ear and ear canal normal    Left Ear: Tympanic membrane, external ear and ear canal normal    Nose: Nose normal    Mouth/Throat: Uvula is midline, oropharynx is clear and moist and mucous membranes are normal  No oropharyngeal exudate, posterior oropharyngeal edema or posterior oropharyngeal erythema  Eyes: Conjunctivae are normal    Cardiovascular: Normal rate, regular rhythm and normal heart sounds  Pulmonary/Chest: Effort normal and breath sounds normal  No respiratory distress  She has no decreased breath sounds  Patient with frequent cough on exam   Lungs slightly decreased lung sounds throughout, persistent crackles appreciated to the right upper and middle lung fields  No wheezing appreciated  Neurological: She is alert  Skin: Skin is warm and dry  She is not diaphoretic  Nursing note and vitals reviewed

## 2019-03-14 ENCOUNTER — HOSPITAL ENCOUNTER (EMERGENCY)
Facility: HOSPITAL | Age: 69
Discharge: HOME | End: 2019-03-14
Attending: EMERGENCY MEDICINE | Admitting: EMERGENCY MEDICINE
Payer: MEDICARE

## 2019-03-14 ENCOUNTER — APPOINTMENT (EMERGENCY)
Dept: RADIOLOGY | Facility: HOSPITAL | Age: 69
End: 2019-03-14
Payer: MEDICARE

## 2019-03-14 VITALS
TEMPERATURE: 98.1 F | SYSTOLIC BLOOD PRESSURE: 135 MMHG | WEIGHT: 145.94 LBS | HEART RATE: 82 BPM | BODY MASS INDEX: 24.29 KG/M2 | DIASTOLIC BLOOD PRESSURE: 77 MMHG | OXYGEN SATURATION: 92 % | RESPIRATION RATE: 20 BRPM

## 2019-03-14 DIAGNOSIS — J44.1 COPD EXACERBATION (HCC): Primary | ICD-10-CM

## 2019-03-14 LAB
ALBUMIN SERPL BCP-MCNC: 3.2 G/DL (ref 3.5–5)
ALP SERPL-CCNC: 48 U/L (ref 46–116)
ALT SERPL W P-5'-P-CCNC: 23 U/L (ref 12–78)
ANION GAP SERPL CALCULATED.3IONS-SCNC: 8 MMOL/L (ref 4–13)
AST SERPL W P-5'-P-CCNC: 30 U/L (ref 5–45)
ATRIAL RATE: 79 BPM
BASOPHILS # BLD AUTO: 0.05 THOUSANDS/ΜL (ref 0–0.1)
BASOPHILS NFR BLD AUTO: 0 % (ref 0–1)
BILIRUB SERPL-MCNC: 0.2 MG/DL (ref 0.2–1)
BUN SERPL-MCNC: 5 MG/DL (ref 5–25)
CALCIUM SERPL-MCNC: 9.2 MG/DL (ref 8.3–10.1)
CHLORIDE SERPL-SCNC: 97 MMOL/L (ref 100–108)
CO2 SERPL-SCNC: 29 MMOL/L (ref 21–32)
CREAT SERPL-MCNC: 0.68 MG/DL (ref 0.6–1.3)
EOSINOPHIL # BLD AUTO: 0.06 THOUSAND/ΜL (ref 0–0.61)
EOSINOPHIL NFR BLD AUTO: 1 % (ref 0–6)
ERYTHROCYTE [DISTWIDTH] IN BLOOD BY AUTOMATED COUNT: 11.8 % (ref 11.6–15.1)
GFR SERPL CREATININE-BSD FRML MDRD: 90 ML/MIN/1.73SQ M
GLUCOSE SERPL-MCNC: 104 MG/DL (ref 65–140)
HCT VFR BLD AUTO: 37.7 % (ref 34.8–46.1)
HGB BLD-MCNC: 12.8 G/DL (ref 11.5–15.4)
IMM GRANULOCYTES # BLD AUTO: 0.08 THOUSAND/UL (ref 0–0.2)
IMM GRANULOCYTES NFR BLD AUTO: 1 % (ref 0–2)
LYMPHOCYTES # BLD AUTO: 2.04 THOUSANDS/ΜL (ref 0.6–4.47)
LYMPHOCYTES NFR BLD AUTO: 17 % (ref 14–44)
MCH RBC QN AUTO: 31.8 PG (ref 26.8–34.3)
MCHC RBC AUTO-ENTMCNC: 34 G/DL (ref 31.4–37.4)
MCV RBC AUTO: 94 FL (ref 82–98)
MONOCYTES # BLD AUTO: 1.01 THOUSAND/ΜL (ref 0.17–1.22)
MONOCYTES NFR BLD AUTO: 8 % (ref 4–12)
NEUTROPHILS # BLD AUTO: 9.04 THOUSANDS/ΜL (ref 1.85–7.62)
NEUTS SEG NFR BLD AUTO: 73 % (ref 43–75)
NRBC BLD AUTO-RTO: 0 /100 WBCS
P AXIS: 65 DEGREES
PLATELET # BLD AUTO: 318 THOUSANDS/UL (ref 149–390)
PMV BLD AUTO: 10 FL (ref 8.9–12.7)
POTASSIUM SERPL-SCNC: 4.4 MMOL/L (ref 3.5–5.3)
PR INTERVAL: 160 MS
PROT SERPL-MCNC: 7.8 G/DL (ref 6.4–8.2)
QRS AXIS: 74 DEGREES
QRSD INTERVAL: 84 MS
QT INTERVAL: 372 MS
QTC INTERVAL: 426 MS
RBC # BLD AUTO: 4.03 MILLION/UL (ref 3.81–5.12)
SODIUM SERPL-SCNC: 134 MMOL/L (ref 136–145)
T WAVE AXIS: 63 DEGREES
VENTRICULAR RATE: 79 BPM
WBC # BLD AUTO: 12.28 THOUSAND/UL (ref 4.31–10.16)

## 2019-03-14 PROCEDURE — 96361 HYDRATE IV INFUSION ADD-ON: CPT

## 2019-03-14 PROCEDURE — 93005 ELECTROCARDIOGRAM TRACING: CPT

## 2019-03-14 PROCEDURE — 86480 TB TEST CELL IMMUN MEASURE: CPT | Performed by: EMERGENCY MEDICINE

## 2019-03-14 PROCEDURE — 93010 ELECTROCARDIOGRAM REPORT: CPT | Performed by: INTERNAL MEDICINE

## 2019-03-14 PROCEDURE — 80053 COMPREHEN METABOLIC PANEL: CPT | Performed by: EMERGENCY MEDICINE

## 2019-03-14 PROCEDURE — 99284 EMERGENCY DEPT VISIT MOD MDM: CPT

## 2019-03-14 PROCEDURE — 85025 COMPLETE CBC W/AUTO DIFF WBC: CPT | Performed by: EMERGENCY MEDICINE

## 2019-03-14 PROCEDURE — 94640 AIRWAY INHALATION TREATMENT: CPT

## 2019-03-14 PROCEDURE — 71046 X-RAY EXAM CHEST 2 VIEWS: CPT

## 2019-03-14 PROCEDURE — 36415 COLL VENOUS BLD VENIPUNCTURE: CPT | Performed by: EMERGENCY MEDICINE

## 2019-03-14 PROCEDURE — 96374 THER/PROPH/DIAG INJ IV PUSH: CPT

## 2019-03-14 RX ORDER — METHYLPREDNISOLONE SODIUM SUCCINATE 125 MG/2ML
125 INJECTION, POWDER, LYOPHILIZED, FOR SOLUTION INTRAMUSCULAR; INTRAVENOUS ONCE
Status: COMPLETED | OUTPATIENT
Start: 2019-03-14 | End: 2019-03-14

## 2019-03-14 RX ORDER — DOXYCYCLINE HYCLATE 100 MG/1
100 CAPSULE ORAL 2 TIMES DAILY
Qty: 14 CAPSULE | Refills: 0 | Status: SHIPPED | OUTPATIENT
Start: 2019-03-14 | End: 2019-03-21

## 2019-03-14 RX ORDER — IPRATROPIUM BROMIDE AND ALBUTEROL SULFATE 2.5; .5 MG/3ML; MG/3ML
3 SOLUTION RESPIRATORY (INHALATION) ONCE
Status: COMPLETED | OUTPATIENT
Start: 2019-03-14 | End: 2019-03-14

## 2019-03-14 RX ORDER — PREDNISONE 10 MG/1
TABLET ORAL
Qty: 30 TABLET | Refills: 0 | Status: SHIPPED | OUTPATIENT
Start: 2019-03-15 | End: 2019-07-31 | Stop reason: ALTCHOICE

## 2019-03-14 RX ORDER — ALBUTEROL SULFATE 90 UG/1
2 AEROSOL, METERED RESPIRATORY (INHALATION) EVERY 4 HOURS PRN
Qty: 1 INHALER | Refills: 0 | Status: SHIPPED | OUTPATIENT
Start: 2019-03-14

## 2019-03-14 RX ORDER — DEXTROMETHORPHAN HYDROBROMIDE AND PROMETHAZINE HYDROCHLORIDE 15; 6.25 MG/5ML; MG/5ML
5 SYRUP ORAL 4 TIMES DAILY PRN
Qty: 118 ML | Refills: 0 | Status: SHIPPED | OUTPATIENT
Start: 2019-03-14 | End: 2019-11-14

## 2019-03-14 RX ADMIN — SODIUM CHLORIDE 1000 ML: 0.9 INJECTION, SOLUTION INTRAVENOUS at 18:01

## 2019-03-14 RX ADMIN — IPRATROPIUM BROMIDE AND ALBUTEROL SULFATE 3 ML: 2.5; .5 SOLUTION RESPIRATORY (INHALATION) at 17:43

## 2019-03-14 RX ADMIN — METHYLPREDNISOLONE SODIUM SUCCINATE 125 MG: 125 INJECTION, POWDER, FOR SOLUTION INTRAMUSCULAR; INTRAVENOUS at 18:04

## 2019-03-14 NOTE — ED PROVIDER NOTES
History  Chief Complaint   Patient presents with    Cough     To ED with c/o worsening cough for 2 weeks  Pt states that she has had cough, congestion, aches, chills x 2 weeks  Has had antibiotic, cough meds and steroids with no relief  This is a 51-year-old female with a history of smoking in the past currently on inhalers presenting with cough over the past 2 weeks she was seen at urgent care twice she was given Zithromax and prednisone she states without much improvement she had fevers initially but not currently initially there was yellow sputum production but now it is clear  She states that she did receive the influenza vaccine this year      History provided by:  Patient  Cough   Cough characteristics:  Productive  Sputum characteristics:  Clear  Severity:  Unable to specify  Onset quality:  Gradual  Duration:  2 weeks  Timing:  Constant  Progression:  Unchanged  Chronicity:  Recurrent  Smoker: Former  Context: upper respiratory infection    Relieved by:  Nothing  Associated symptoms: sore throat        Prior to Admission Medications   Prescriptions Last Dose Informant Patient Reported? Taking?    ALPRAZolam (XANAX) 0 25 mg tablet   Yes No   Sig: Take 0 5 mg by mouth 3 (three) times a day     Ascorbic Acid (VITAMIN C) 1000 MG tablet   Yes No   Sig: Take 1,000 mg by mouth daily   B Complex Vitamins (VITAMIN B COMPLEX PO)   Yes No   Sig: Take by mouth   CALCIUM PO   Yes No   Sig: Take by mouth   RESTASIS MULTIDOSE 0 05 % ophthalmic emulsion   Yes No   Sig: INSTILL 1 DROP INTO BOTH EYES TWICE A DAY   VITAMIN E PO   Yes No   Sig: Take by mouth   acetaminophen-codeine (TYLENOL #3) 300-30 mg per tablet   Yes No   dicyclomine (BENTYL) 20 mg tablet   No No   Sig: Take 1 tablet by mouth every 6 (six) hours As needed for abdominal cramps   escitalopram (LEXAPRO) 20 mg tablet   Yes No   Sig: Take 20 mg by mouth daily   levothyroxine 125 mcg tablet   Yes No   Sig: TAKE 1 TABLET EVERY MORNING ON EMPTY STOMACH DAILY ORALLY   montelukast (SINGULAIR) 10 mg tablet   Yes No   promethazine-codeine (PHENERGAN WITH CODEINE) 6 25-10 mg/5 mL syrup   No No   Sig: Take 5 mL by mouth every 4 (four) hours as needed for cough   zolpidem (AMBIEN) 10 mg tablet   Yes No   Sig: Take 10 mg by mouth daily at bedtime as needed for sleep      Facility-Administered Medications: None       Past Medical History:   Diagnosis Date    Anxiety     Cancer [facility]) 2012    right sided breast lumpectomy with 3 lymph nodes removed    Depression     Disease of thyroid gland     h/o hyperthyroidism    Former heavy tobacco smoker     quit 2017    H/O radioactive iodine thyroid ablation     History of pneumonia     History of transfusion     Infectious viral hepatitis     HEP C +, treated x2 stopped b/c reaction, levels checked regularly, pt reports,in the '80's had many transfusions for low platelets    Irritable bowel syndrome        Past Surgical History:   Procedure Laterality Date    BREAST LUMPECTOMY Right     w/SNL done @ [place],  s/p RTX     BUNIONECTOMY Right 11/13/2017    Procedure: Maribell Neely;  Surgeon: Irvin Palmer DPM;  [facility];  Service: Podiatry    CHOLECYSTECTOMY      MULTIPLE TOOTH EXTRACTIONS      [place] FUSION BIG TOE,MT-P JT Right 2/13/2017    Procedure: FUSION GREAT TOE AND PLATE;  Surgeon: Irvin Palmer DPM;  [facility];  Service: Podiatry    [place] REPAIR OF Chioma Eddy Right 11/13/2017    Procedure: FOOT 2, 3 HAMMERTOE REPAIR;  Surgeon: Irvin Palmer DPM;  [facility];  Service: Podiatry    VAGINAL DELIVERY      X 2       Family History   Problem Relation Age of Onset    Diabetes Mother     Heart disease Mother     Heart disease Father     Diabetes Brother     Heart disease Brother      I have reviewed and agree with the history as documented      Social History     Tobacco Use    Smoking status: Former Smoker     Packs/day: 0 50     Years: 46 00     Pack years: 23 00 Types: Cigarettes     Last attempt to quit: 2017     Years since quittin 1    Smokeless tobacco: Never Used   Substance Use Topics    Alcohol use: Yes     Comment: rarely    Drug use: No        Review of Systems   Constitutional: Positive for fatigue  HENT: Positive for sore throat  Respiratory: Positive for cough  All other systems reviewed and are negative  Physical Exam  Physical Exam   Constitutional: She is oriented to person, place, and time  She appears well-nourished  HENT:   Head: Normocephalic and atraumatic  Right Ear: External ear normal    Left Ear: External ear normal    Nose: Nose normal    Mouth/Throat: Oropharynx is clear and moist    Eyes: Pupils are equal, round, and reactive to light  EOM are normal  Right eye exhibits no discharge  Left eye exhibits no discharge  No scleral icterus  Neck: Neck supple  No JVD present  No tracheal deviation present  Cardiovascular: Normal rate, regular rhythm and intact distal pulses  Exam reveals no gallop and no friction rub  No murmur heard  Pulmonary/Chest: No stridor  No respiratory distress  She has wheezes  She has rales  Decreased breath sounds bilateral with faint crackles and wheeze in the bases  Patient with persistent cough   Abdominal: Soft  Bowel sounds are normal  She exhibits no distension and no mass  There is no tenderness  There is no rebound and no guarding  Musculoskeletal: Normal range of motion  She exhibits no edema, tenderness or deformity  Neurological: She is alert and oriented to person, place, and time  No cranial nerve deficit or sensory deficit  She exhibits normal muscle tone  Coordination normal    Skin: Skin is warm and dry  No rash noted  She is not diaphoretic  Psychiatric: She has a normal mood and affect  Her behavior is normal  Thought content normal    Nursing note and vitals reviewed        Vital Signs  ED Triage Vitals [19 1705]   Temperature Pulse Respirations Blood Pressure SpO2   98 1 °F (36 7 °C) 77 20 155/67 94 %      Temp Source Heart Rate Source Patient Position - Orthostatic VS BP Location FiO2 (%)   Tympanic Monitor Sitting Right arm --      Pain Score       5           Vitals:    03/14/19 1705 03/14/19 1847   BP: 155/67 135/77   Pulse: 77 82   Patient Position - Orthostatic VS: Sitting Lying       qSOFA     Row Name 03/14/19 1847 03/14/19 1705             Altered mental status GCS < 15           Respiratory Rate > / =22  0  0       Systolic BP < / =024  0  0       Q Sofa Score  0  0             Visual Acuity      ED Medications  Medications   sodium chloride 0 9 % bolus 1,000 mL (1,000 mL Intravenous New Bag 3/14/19 1801)   ipratropium-albuterol (DUO-NEB) 0 5-2 5 mg/3 mL inhalation solution 3 mL (3 mL Nebulization Given 3/14/19 1743)   methylPREDNISolone sodium succinate (Solu-MEDROL) injection 125 mg (125 mg Intravenous Given 3/14/19 1804)       Diagnostic Studies  Results Reviewed     Procedure Component Value Units Date/Time    Comprehensive metabolic panel [[id number]]  (Abnormal) Collected:  03/14/19 1801    Lab Status:  Final result Specimen:  Blood from Arm, Left Updated:  03/14/19 1831     Sodium 134 mmol/L      Potassium 4 4 mmol/L      Chloride 97 mmol/L      CO2 29 mmol/L      ANION GAP 8 mmol/L      BUN 5 mg/dL      Creatinine 0 68 mg/dL      Glucose 104 mg/dL      Calcium 9 2 mg/dL      AST 30 U/L      ALT 23 U/L      Alkaline Phosphatase 48 U/L      Total Protein 7 8 g/dL      Albumin 3 2 g/dL      Total Bilirubin 0 20 mg/dL      eGFR 90 ml/min/1 73sq m     Narrative:       National Kidney Disease Education Program recommendations are as follows:  GFR calculation is accurate only with a steady state creatinine  Chronic Kidney disease less than 60 ml/min/1 73 sq  meters  Kidney failure less than 15 ml/min/1 73 sq  meters      CBC and differential [[id number]]  (Abnormal) Collected:  03/14/19 1801    Lab Status:  Final result Specimen:  Blood from Arm, Left Updated:  03/14/19 1817     WBC 12 28 Thousand/uL      RBC 4 03 Million/uL      Hemoglobin 12 8 g/dL      Hematocrit 37 7 %      MCV 94 fL      MCH 31 8 pg      MCHC 34 0 g/dL      RDW 11 8 %      MPV 10 0 fL      Platelets 003 Thousands/uL      nRBC 0 /100 WBCs      Neutrophils Relative 73 %      Immat GRANS % 1 %      Lymphocytes Relative 17 %      Monocytes Relative 8 %      Eosinophils Relative 1 %      Basophils Relative 0 %      Neutrophils Absolute 9 04 Thousands/µL      Immature Grans Absolute 0 08 Thousand/uL      Lymphocytes Absolute 2 04 Thousands/µL      Monocytes Absolute 1 01 Thousand/µL      Eosinophils Absolute 0 06 Thousand/µL      Basophils Absolute 0 05 Thousands/µL     Quantiferon TB Gold Plus [[id number]] Collected:  03/14/19 1801    Lab Status: In process Specimen:  Blood from Arm, Left Updated:  03/14/19 1813                 XR chest 2 views   ED Interpretation by Milana Greenberg DO (03/14 1840)   Chronic lung changes without any acute infiltrate or pneumothorax or cardiomegaly                 Procedures  ECG 12 Lead Documentation  Date/Time: 3/14/2019 5:40 PM  Performed by: Milana Greenberg DO  Authorized by: Milana Greenberg DO     ECG reviewed by me, the ED Provider: yes    Patient location:  ED  Rate:     ECG rate:  79  Rhythm:     Rhythm: sinus rhythm    Conduction:     Conduction: normal    T waves:     T waves: normal             Phone Contacts  ED Phone Contact    ED Course  ED Course as of Mar 14 1853   Thu Mar 14, 2019   [address] Patient feeling better after mini neb treatment oxygen saturation remains in the 93/94 range she does have Spiriva and albuterol inhaler home    At this point will treat her as exacerbation of COPD will place her on another course of steroids and a different antibiotic                                  MDM  Number of Diagnoses or Management Options  Diagnosis management comments: Cough with heavy smoking history in the past no documented fever at this time will check chest x-ray to rule out infiltrate basic labs give nebulized treatment and IV steroids       Amount and/or Complexity of Data Reviewed  Clinical lab tests: ordered  Tests in the radiology section of CPT®: ordered        Disposition  Final diagnoses:   COPD exacerbation ([address] )     Time reflects when diagnosis was documented in both MDM as applicable and the Disposition within this note     Time User Action Codes Description Comment    3/14/2019  6:47 PM Arlette Suárez Add [J44 1] COPD exacerbation [facility])       ED Disposition     ED Disposition Condition Date/Time Comment    Discharge Stable Thu Mar 14, 2019  6:47 PM [facility] discharge to home/self care  Follow-up Information     Follow up With Specialties Details Why Contact Info    Eleni Rondon, DO Family Medicine In 1 week [address] [place] [id number]  [phone]            Patient's Medications   Discharge Prescriptions    ALBUTEROL (PROVENTIL HFA,VENTOLIN HFA) 90 MCG/ACT INHALER    Inhale 2 puffs every 4 (four) hours as needed for wheezing       Start Date: 3/14/2019 End Date: --       Order Dose: 2 puffs       Quantity: 1 Inhaler    Refills: 0    DOXYCYCLINE HYCLATE (VIBRAMYCIN) 100 MG CAPSULE    Take 1 capsule (100 mg total) by mouth 2 (two) times a day for 7 days       Start Date: 3/14/2019 End Date: 3/21/2019       Order Dose: 100 mg       Quantity: 14 capsule    Refills: 0    PREDNISONE 10 MG TABLET    5 pills x 2 days, 4 pills x 2 days, 3 pills x 2 days, 2 pills x 2 days, 1 pill x 2 days       Start Date: 3/15/2019 End Date: --       Order Dose: --       Quantity: 30 tablet    Refills: 0    PROMETHAZINE-DEXTROMETHORPHAN (PHENERGAN-DM) 6 25-15 MG/5 ML ORAL SYRUP    Take 5 mL by mouth 4 (four) times a day as needed for cough       Start Date: 3/14/2019 End Date: --       Order Dose: 5 mL       Quantity: 118 mL    Refills: 0     No discharge procedures on file      ED Provider  Electronically Signed by           Joaquin Toledo True Hernandez,   03/14/19 6948

## 2019-03-18 LAB
GAMMA INTERFERON BACKGROUND BLD IA-ACNC: 0.06 IU/ML
M TB IFN-G BLD-IMP: NEGATIVE
M TB IFN-G CD4+ BCKGRND COR BLD-ACNC: 0 IU/ML
M TB IFN-G CD4+ BCKGRND COR BLD-ACNC: 0.01 IU/ML
MITOGEN IGNF BCKGRD COR BLD-ACNC: >10 IU/ML

## 2019-07-31 ENCOUNTER — OFFICE VISIT (OUTPATIENT)
Dept: URGENT CARE | Facility: CLINIC | Age: 69
End: 2019-07-31
Payer: MEDICARE

## 2019-07-31 VITALS
TEMPERATURE: 97.1 F | RESPIRATION RATE: 16 BRPM | HEART RATE: 95 BPM | DIASTOLIC BLOOD PRESSURE: 72 MMHG | SYSTOLIC BLOOD PRESSURE: 122 MMHG | OXYGEN SATURATION: 95 % | BODY MASS INDEX: 23.16 KG/M2 | WEIGHT: 139 LBS | HEIGHT: 65 IN

## 2019-07-31 DIAGNOSIS — J20.8 ACUTE BACTERIAL BRONCHITIS: Primary | ICD-10-CM

## 2019-07-31 DIAGNOSIS — B96.89 ACUTE BACTERIAL BRONCHITIS: Primary | ICD-10-CM

## 2019-07-31 PROCEDURE — 99213 OFFICE O/P EST LOW 20 MIN: CPT | Performed by: FAMILY MEDICINE

## 2019-07-31 PROCEDURE — G0463 HOSPITAL OUTPT CLINIC VISIT: HCPCS | Performed by: FAMILY MEDICINE

## 2019-07-31 NOTE — PATIENT INSTRUCTIONS
Patient struck to to continue the Flonase for eustachian tube dysfunction  Continue albuterol inhaler as needed and Spiriva as directed  Would appear she should have Levaquin at the pharmacy for her, if she does not have Levaquin there she can call us and I will be happy to call in Levaquin for her    Follow-up with PCP if symptoms not improving in 5 days

## 2019-07-31 NOTE — PROGRESS NOTES
[facility]        NAME: Jeffery Fischer is a 71 y o  female  : 1950    MRN: [id number]  DATE: 2019  TIME: 3:32 PM    Assessment and Plan   Acute bacterial bronchitis [J20 8, B96 89]  1  Acute bacterial bronchitis           Patient Instructions   Patient Instructions   Patient struck to to continue the Flonase for eustachian tube dysfunction  Continue albuterol inhaler as needed and Spiriva as directed  Would appear she should have Levaquin at the pharmacy for her, if she does not have Levaquin there she can call us and I will be happy to call in Levaquin for her  Follow-up with PCP if symptoms not improving in 5 days        Proceed to  ER if symptoms worsen  Chief Complaint     Chief Complaint   Patient presents with    Cold Like Symptoms     Pt reports 2 weeks of a sore throat, productive cough with yellow sputum and left ear pain  She reports her PCP gave her an antibiotic which she completed 1 week ago  History of Present Illness       Patient presents with 2 week history of productive cough of dark phlegm, sinus congestion ear pain and sore throat  She has seen her PCP for the symptoms and was given antibiotics, she is not entirely sure which ones they were  She spoke with them today and was advised to come to Urgent Care for evaluation  Review of her medication list indicates Levaquin was called to her pharmacy today 500 mg once daily for 10 days  Patient was unaware of this  She states she does have a history of COPD and does get bronchitis with some degree of frequency, she does also use Flonase and albuterol inhaler as needed  She has noted a wheezing and chest tightness exertional dyspnea no shortness of breath at rest no fever no chills  Review of Systems   Review of Systems   Constitutional: Negative  HENT: Positive for congestion and sore throat  Eyes: Negative  Respiratory: Positive for cough, chest tightness, shortness of breath and wheezing  Cardiovascular: Negative            Current Medications       Current Outpatient Medications:     acetaminophen-codeine (TYLENOL #3) 300-30 mg per tablet, , Disp: , Rfl:     ALPRAZolam (XANAX) 0 25 mg tablet, Take 0 5 mg by mouth 3 (three) times a day  , Disp: , Rfl:     Ascorbic Acid (VITAMIN C) 1000 MG tablet, Take 1,000 mg by mouth daily, Disp: , Rfl:     B Complex Vitamins (VITAMIN B COMPLEX PO), Take by mouth, Disp: , Rfl:     CALCIUM PO, Take by mouth, Disp: , Rfl:     dicyclomine (BENTYL) 20 mg tablet, Take 1 tablet by mouth every 6 (six) hours As needed for abdominal cramps, Disp: 20 tablet, Rfl: 0    escitalopram (LEXAPRO) 20 mg tablet, Take 20 mg by mouth daily, Disp: , Rfl: 1    levothyroxine 125 mcg tablet, TAKE 1 TABLET EVERY MORNING ON EMPTY STOMACH DAILY ORALLY, Disp: , Rfl: 1    montelukast (SINGULAIR) 10 mg tablet, , Disp: , Rfl:     RESTASIS MULTIDOSE 0 05 % ophthalmic emulsion, INSTILL 1 DROP INTO BOTH EYES TWICE A DAY, Disp: , Rfl: 3    tiotropium (SPIRIVA) 18 mcg inhalation capsule, Place 18 mcg into inhaler and inhale daily, Disp: , Rfl:     VITAMIN E PO, Take by mouth, Disp: , Rfl:     zolpidem (AMBIEN) 10 mg tablet, Take 10 mg by mouth daily at bedtime as needed for sleep, Disp: , Rfl:     albuterol (PROVENTIL HFA,VENTOLIN HFA) 90 mcg/act inhaler, Inhale 2 puffs every 4 (four) hours as needed for wheezing (Patient not taking: Reported on 7/31/2019), Disp: 1 Inhaler, Rfl: 0    promethazine-codeine (PHENERGAN WITH CODEINE) 6 25-10 mg/5 mL syrup, Take 5 mL by mouth every 4 (four) hours as needed for cough (Patient not taking: Reported on 7/31/2019), Disp: 120 mL, Rfl: 0    promethazine-dextromethorphan (PHENERGAN-DM) 6 25-15 mg/5 mL oral syrup, Take 5 mL by mouth 4 (four) times a day as needed for cough (Patient not taking: Reported on 7/31/2019), Disp: 118 mL, Rfl: 0    Current Allergies     Allergies as of 07/31/2019    (No Known Allergies)            The following portions of the patient's history were reviewed and updated as appropriate: allergies, current medications, past family history, past medical history, past social history, past surgical history and problem list      Past Medical History:   Diagnosis Date    Anxiety     Cancer [facility]) 2012    right sided breast lumpectomy with 3 lymph nodes removed    Depression     Disease of thyroid gland     h/o hyperthyroidism    Former heavy tobacco smoker     quit 2017    H/O radioactive iodine thyroid ablation     History of pneumonia     History of transfusion     Infectious viral hepatitis     HEP C +, treated x2 stopped b/c reaction, levels checked regularly, pt reports,in the '80's had many transfusions for low platelets    Irritable bowel syndrome        Past Surgical History:   Procedure Laterality Date    BREAST LUMPECTOMY Right     w/SNL done @ [place],  s/p RTX     BUNIONECTOMY Right 11/13/2017    Procedure: Amaryllis Noun;  Surgeon: Maegan Linton DPM;  [facility];  Service: Podiatry    CHOLECYSTECTOMY      MULTIPLE TOOTH EXTRACTIONS      OR FUSION BIG TOE,MT-P JT Right 2/13/2017    Procedure: FUSION GREAT TOE AND PLATE;  Surgeon: Maegan Linton DPM;  [facility];  Service: Podiatry    OR REPAIR OF Dene Ing Right 11/13/2017    Procedure: FOOT 2, 3 HAMMERTOE REPAIR;  Surgeon: Maegan Linton DPM;  [facility];  Service: Podiatry    VAGINAL DELIVERY      X 2       Family History   Problem Relation Age of Onset    Diabetes Mother     Heart disease Mother     Heart disease Father     Diabetes Brother     Heart disease Brother          Medications have been verified  Objective   /72   Pulse 95   Temp (!) 97 1 °F (36 2 °C)   Resp 16   Ht 5' 5" (1 651 m)   Wt 63 kg (139 lb)   LMP  (LMP Unknown)   SpO2 95%   BMI 23 13 kg/m²        Physical Exam     Physical Exam   Constitutional: She appears well-developed and well-nourished  No distress     HENT: Right Ear: External ear normal    Left Ear: External ear normal    Mouth/Throat: Oropharynx is clear and moist  No oropharyngeal exudate  Mild intranasal mucosal erythema edema and rhinorrhea, PND, TM clear bilaterally   Eyes: Conjunctivae are normal    Neck: Neck supple  Cardiovascular: Normal rate, regular rhythm and normal heart sounds  Pulmonary/Chest: Effort normal    Mild bilateral expiratory wheezes and coarse breath sounds worse with cough   Abdominal: Soft  Lymphadenopathy:     She has no cervical adenopathy

## 2019-10-10 ENCOUNTER — OFFICE VISIT (OUTPATIENT)
Dept: URGENT CARE | Facility: CLINIC | Age: 69
End: 2019-10-10
Payer: MEDICARE

## 2019-10-10 VITALS
TEMPERATURE: 98.3 F | HEIGHT: 65 IN | OXYGEN SATURATION: 96 % | RESPIRATION RATE: 16 BRPM | SYSTOLIC BLOOD PRESSURE: 120 MMHG | DIASTOLIC BLOOD PRESSURE: 72 MMHG | BODY MASS INDEX: 23.16 KG/M2 | WEIGHT: 139 LBS | HEART RATE: 66 BPM

## 2019-10-10 DIAGNOSIS — J06.9 UPPER RESPIRATORY TRACT INFECTION, UNSPECIFIED TYPE: Primary | ICD-10-CM

## 2019-10-10 PROCEDURE — 99213 OFFICE O/P EST LOW 20 MIN: CPT | Performed by: PREVENTIVE MEDICINE

## 2019-10-10 PROCEDURE — G0463 HOSPITAL OUTPT CLINIC VISIT: HCPCS | Performed by: PREVENTIVE MEDICINE

## 2019-10-10 RX ORDER — AZITHROMYCIN 250 MG/1
TABLET, FILM COATED ORAL
Qty: 6 TABLET | Refills: 0 | Status: SHIPPED | OUTPATIENT
Start: 2019-10-10 | End: 2019-10-14

## 2019-10-10 NOTE — PROGRESS NOTES
[facility]        NAME: Ren Tucker is a 71 y o  female  : 1950    MRN: [id number]  DATE: October 10, 2019  TIME: 3:13 PM    Assessment and Plan   Upper respiratory tract infection, unspecified type [J06 9]  1  Upper respiratory tract infection, unspecified type  azithromycin (ZITHROMAX) 250 mg tablet         Patient Instructions       Follow up with PCP in 3-5 days  Proceed to  ER if symptoms worsen  Chief Complaint     Chief Complaint   Patient presents with    Cold Like Symptoms     Began two days ago  sinus pressure, abdominal pain, diarrhea, body aches and cough  History of Present Illness       Two days of fever body aches and cough  Note history of COPD      Review of Systems   Review of Systems   Constitutional: Positive for fatigue and fever  HENT: Positive for congestion and sinus pressure  Respiratory: Positive for cough            Current Medications       Current Outpatient Medications:     acetaminophen-codeine (TYLENOL #3) 300-30 mg per tablet, , Disp: , Rfl:     ALPRAZolam (XANAX) 0 25 mg tablet, Take 0 5 mg by mouth 3 (three) times a day  , Disp: , Rfl:     Ascorbic Acid (VITAMIN C) 1000 MG tablet, Take 1,000 mg by mouth daily, Disp: , Rfl:     B Complex Vitamins (VITAMIN B COMPLEX PO), Take by mouth, Disp: , Rfl:     CALCIUM PO, Take by mouth, Disp: , Rfl:     dicyclomine (BENTYL) 20 mg tablet, Take 1 tablet by mouth every 6 (six) hours As needed for abdominal cramps, Disp: 20 tablet, Rfl: 0    escitalopram (LEXAPRO) 20 mg tablet, Take 20 mg by mouth daily, Disp: , Rfl: 1    levothyroxine 125 mcg tablet, TAKE 1 TABLET EVERY MORNING ON EMPTY STOMACH DAILY ORALLY, Disp: , Rfl: 1    montelukast (SINGULAIR) 10 mg tablet, , Disp: , Rfl:     tiotropium (SPIRIVA) 18 mcg inhalation capsule, Place 18 mcg into inhaler and inhale daily, Disp: , Rfl:     VITAMIN E PO, Take by mouth, Disp: , Rfl:     zolpidem (AMBIEN) 10 mg tablet, Take 10 mg by mouth daily at bedtime as needed for sleep, Disp: , Rfl:     albuterol (PROVENTIL HFA,VENTOLIN HFA) 90 mcg/act inhaler, Inhale 2 puffs every 4 (four) hours as needed for wheezing (Patient not taking: Reported on 7/31/2019), Disp: 1 Inhaler, Rfl: 0    azithromycin (ZITHROMAX) 250 mg tablet, Take 2 tablets today then 1 tablet daily x 4 days, Disp: 6 tablet, Rfl: 0    promethazine-codeine (PHENERGAN WITH CODEINE) 6 25-10 mg/5 mL syrup, Take 5 mL by mouth every 4 (four) hours as needed for cough (Patient not taking: Reported on 7/31/2019), Disp: 120 mL, Rfl: 0    promethazine-dextromethorphan (PHENERGAN-DM) 6 25-15 mg/5 mL oral syrup, Take 5 mL by mouth 4 (four) times a day as needed for cough (Patient not taking: Reported on 7/31/2019), Disp: 118 mL, Rfl: 0    RESTASIS MULTIDOSE 0 05 % ophthalmic emulsion, INSTILL 1 DROP INTO BOTH EYES TWICE A DAY, Disp: , Rfl: 3    Current Allergies     Allergies as of 10/10/2019    (No Known Allergies)            The following portions of the patient's history were reviewed and updated as appropriate: allergies, current medications, past family history, past medical history, past social history, past surgical history and problem list      Past Medical History:   Diagnosis Date    Anxiety     Cancer [facility]) 2012    right sided breast lumpectomy with 3 lymph nodes removed    Depression     Disease of thyroid gland     h/o hyperthyroidism    Former heavy tobacco smoker     quit 2017    H/O radioactive iodine thyroid ablation     History of pneumonia     History of transfusion     Infectious viral hepatitis     HEP C +, treated x2 stopped b/c reaction, levels checked regularly, pt reports,in the '80's had many transfusions for low platelets    Irritable bowel syndrome        Past Surgical History:   Procedure Laterality Date    BREAST LUMPECTOMY Right     w/SNL done @ [place],  s/p RTX     BUNIONECTOMY Right 11/13/2017    Procedure: Garcia Capellan;  Surgeon: Kranthi Swift CUONG;  [facility];  Service: Podiatry    CHOLECYSTECTOMY      MULTIPLE TOOTH EXTRACTIONS      NE FUSION BIG TOE,MT-P JT Right 2/13/2017    Procedure: FUSION GREAT TOE AND PLATE;  Surgeon: Celia Ruth DPM;  [facility];  Service: Podiatry    NE REPAIR OF Keke Rasmussen Right 11/13/2017    Procedure: FOOT 2, 3 HAMMERTOE REPAIR;  Surgeon: Celia Ruth DPM;  [facility];  Service: Podiatry    VAGINAL DELIVERY      X 2       Family History   Problem Relation Age of Onset    Diabetes Mother     Heart disease Mother     Heart disease Father     Diabetes Brother     Heart disease Brother          Medications have been verified  Objective   /72   Pulse 66   Temp 98 3 °F (36 8 °C)   Resp 16   Ht 5' 5" (1 651 m)   Wt 63 kg (139 lb)   LMP  (LMP Unknown)   SpO2 96%   BMI 23 13 kg/m²        Physical Exam     Physical Exam   HENT:   Right Ear: External ear normal    Left Ear: External ear normal    Mouth/Throat: Oropharynx is clear and moist    Cardiovascular: Normal heart sounds  Pulmonary/Chest: Breath sounds normal    Lymphadenopathy:     She has no cervical adenopathy

## 2019-11-14 ENCOUNTER — OFFICE VISIT (OUTPATIENT)
Dept: URGENT CARE | Facility: CLINIC | Age: 69
End: 2019-11-14
Payer: MEDICARE

## 2019-11-14 VITALS
HEART RATE: 84 BPM | WEIGHT: 139 LBS | RESPIRATION RATE: 16 BRPM | BODY MASS INDEX: 23.16 KG/M2 | DIASTOLIC BLOOD PRESSURE: 72 MMHG | OXYGEN SATURATION: 99 % | HEIGHT: 65 IN | TEMPERATURE: 99.1 F | SYSTOLIC BLOOD PRESSURE: 122 MMHG

## 2019-11-14 DIAGNOSIS — J20.9 ACUTE BRONCHITIS, UNSPECIFIED ORGANISM: Primary | ICD-10-CM

## 2019-11-14 PROCEDURE — G0463 HOSPITAL OUTPT CLINIC VISIT: HCPCS | Performed by: NURSE PRACTITIONER

## 2019-11-14 PROCEDURE — 99213 OFFICE O/P EST LOW 20 MIN: CPT | Performed by: NURSE PRACTITIONER

## 2019-11-14 RX ORDER — BENZONATATE 200 MG/1
200 CAPSULE ORAL 3 TIMES DAILY PRN
Qty: 20 CAPSULE | Refills: 0 | Status: SHIPPED | OUTPATIENT
Start: 2019-11-14 | End: 2019-11-21

## 2019-11-14 RX ORDER — DEXTROMETHORPHAN HYDROBROMIDE AND PROMETHAZINE HYDROCHLORIDE 15; 6.25 MG/5ML; MG/5ML
5 SOLUTION ORAL 4 TIMES DAILY PRN
Qty: 118 ML | Refills: 0 | Status: SHIPPED | OUTPATIENT
Start: 2019-11-14 | End: 2019-11-21

## 2019-11-14 NOTE — PATIENT INSTRUCTIONS
We saw you today for bronchitis  Continue taking the Zithromax as prescribed by the other physician  You may take the [facility] during the day every 8 hours to help with cough  You may take the cough syrup at night to help with cough and with sleep  You may actually take the cough syrup every 6 hours in place of the [facility]  However, please be advised that it does cause drowsiness so do not operate a vehicle while taking and be prepared to sleep after taking  Rest   Drink plenty of fluids  If you're not feeling better in 3-4 days, I would be re-evaluated at that time  90% of the time bronchitis is viral in nature with which may be why the Z-Micky is not working  In addition, this is the pack, while only taken for 5 days, is in your body for 10 days working

## 2019-11-14 NOTE — PROGRESS NOTES
Assessment/Plan    Acute bronchitis, unspecified organism [J20 9]  1  Acute bronchitis, unspecified organism  benzonatate (TESSALON) 200 MG capsule    Promethazine-DM (PHENERGAN-DM) 6 25-15 mg/5 mL oral syrup         Subjective:     Patient ID: Delta Perez is a 71 y o  female  Reason For Visit / Chief Complaint  Chief Complaint   Patient presents with    Cold Like Symptoms     Began four days ago  cough, body aches, HA and sore throat  She reports she was prescribed a z-pack monday but denies any change  This is a 59-year-old female patient who presents to the urgent care today  Patient is complaining of congestion, cough, sore throat and body aches for approximately 4 days  Patient works at the [employer] office and when she called out sick on Tuesday (3 days ago), he gave her a prescription for a Z-Micky  Patient states his Z-Micky is not working  Patient denies fever or chills  Patient does report feeling run down  Patient is otherwise healthy  She has no known allergies          Past Medical History:   Diagnosis Date    Anxiety     Cancer [facility]) 2012    right sided breast lumpectomy with 3 lymph nodes removed    Depression     Disease of thyroid gland     h/o hyperthyroidism    Former heavy tobacco smoker     quit 2017    H/O radioactive iodine thyroid ablation     History of pneumonia     History of transfusion     Infectious viral hepatitis     HEP C +, treated x2 stopped b/c reaction, levels checked regularly, pt reports,in the '80's had many transfusions for low platelets    Irritable bowel syndrome        Past Surgical History:   Procedure Laterality Date    BREAST LUMPECTOMY Right     w/SNL done @ [place],  s/p RTX     BUNIONECTOMY Right 11/13/2017    Procedure: Mike Brandt;  Surgeon: Mohan Hansen DPM;  Location[facility];  Service: Podiatry    CHOLECYSTECTOMY      MULTIPLE TOOTH EXTRACTIONS      NM FUSION BIG TOE,MT-P JT Right 2/13/2017    Procedure: FUSION GREAT TOE AND PLATE;  Surgeon: Smith Mast DPM;  Location:  MAIN OR;  Service: Podiatry    [place] REPAIR OF [place] Right 11/13/2017    Procedure: FOOT 2, 3 HAMMERTOE REPAIR;  Surgeon: Smith Mast DPM;  Location:  MAIN OR;  Service: Podiatry    VAGINAL DELIVERY      X 2       Family History   Problem Relation Age of Onset    Diabetes Mother     Heart disease Mother     Heart disease Father     Diabetes Brother     Heart disease Brother        Review of Systems   Constitutional: Positive for fatigue  Negative for chills and fever  HENT: Positive for congestion, sinus pressure, sinus pain and sore throat  Negative for ear pain and trouble swallowing  Respiratory: Positive for cough  Negative for apnea, choking, chest tightness, shortness of breath, wheezing and stridor  Cardiovascular: Negative for chest pain  Musculoskeletal: Negative for back pain  Skin: Negative for color change  Neurological: Positive for headaches  Objective:    /72   Pulse 84   Temp 99 1 °F (37 3 °C)   Resp 16   Ht 5' 5" (1 651 m)   Wt 63 kg (139 lb)   LMP  (LMP Unknown)   SpO2 99%   BMI 23 13 kg/m²     Physical Exam   Constitutional: She is oriented to person, place, and time  Vital signs are normal  She appears well-developed and well-nourished  She does not appear ill  HENT:   Head: Normocephalic and atraumatic  Right Ear: Hearing, tympanic membrane, external ear and ear canal normal    Left Ear: Hearing, external ear and ear canal normal  Tympanic membrane is not perforated, not erythematous and not bulging  A middle ear effusion is present  Nose: Right sinus exhibits maxillary sinus tenderness and frontal sinus tenderness  Left sinus exhibits maxillary sinus tenderness and frontal sinus tenderness  Mouth/Throat: Uvula is midline, oropharynx is clear and moist and mucous membranes are normal    Neck: Normal range of motion     Cardiovascular: Normal rate, regular rhythm and normal heart sounds  Exam reveals no gallop and no friction rub  No murmur heard  Pulmonary/Chest: Effort normal and breath sounds normal  No stridor  She has no decreased breath sounds  She has no wheezes  She has no rhonchi  She has no rales  Musculoskeletal: Normal range of motion  Neurological: She is alert and oriented to person, place, and time  Skin: Skin is warm and dry  Capillary refill takes less than 2 seconds  Psychiatric: She has a normal mood and affect  Nursing note and vitals reviewed

## 2020-09-11 ENCOUNTER — OFFICE VISIT (OUTPATIENT)
Dept: PODIATRY | Facility: CLINIC | Age: 70
End: 2020-09-11
Payer: MEDICARE

## 2020-09-11 ENCOUNTER — APPOINTMENT (OUTPATIENT)
Dept: RADIOLOGY | Facility: CLINIC | Age: 70
End: 2020-09-11
Payer: MEDICARE

## 2020-09-11 VITALS — HEIGHT: 65 IN | BODY MASS INDEX: 23.16 KG/M2 | WEIGHT: 139 LBS | TEMPERATURE: 97.6 F

## 2020-09-11 DIAGNOSIS — M21.612 BUNION OF GREAT TOE OF LEFT FOOT: ICD-10-CM

## 2020-09-11 DIAGNOSIS — M21.612 BUNION OF GREAT TOE OF LEFT FOOT: Primary | ICD-10-CM

## 2020-09-11 PROCEDURE — 99213 OFFICE O/P EST LOW 20 MIN: CPT | Performed by: PODIATRIST

## 2020-09-11 PROCEDURE — 73630 X-RAY EXAM OF FOOT: CPT

## 2020-09-28 PROBLEM — M21.612 BUNION OF GREAT TOE OF LEFT FOOT: Status: ACTIVE | Noted: 2020-09-28

## 2020-09-28 NOTE — PROGRESS NOTES
This patient was seen on 9/11/20  Assessment:    Problem List Items Addressed This Visit        Musculoskeletal and Integument    Bunion of great toe of left foot - Primary    Relevant Orders    X-ray foot left 3+ views (Completed)          Plan:  Plain xrays ordered 3 views Left foot for assessment of joint and angular deformities  Will bring her back to go over the xrays and discuss non-surgical and surgical options  Diagnoses and all orders for this visit:    Bunion of great toe of left foot  -     X-ray foot left 3+ views; Future          Subjective:      Patient ID: Kris Dwyer is a 79 y o  female  Graciela Snowman is here for her Left foot pain  She points to the great toe joint and notes progressive pain in the joint with limited ROM and denies self treatment  She's known to me from prior surgical fusion of the contralateral Right first metatarsophalangeal joint  The following portions of the patient's history were reviewed and updated as appropriate: allergies, current medications, past family history, past medical history, past social history, past surgical history and problem list     Review of Systems   Constitutional: Positive for activity change  Negative for appetite change, chills, diaphoresis, fatigue, fever and unexpected weight change  Respiratory: Negative  Cardiovascular: Negative  Gastrointestinal: Negative  Musculoskeletal: Positive for arthralgias, gait problem and joint swelling  Hematological: Negative  Psychiatric/Behavioral: Negative  Objective:      Temp 97 6 °F (36 4 °C)   Ht 5' 5" (1 651 m)   Wt 63 kg (139 lb)   LMP  (LMP Unknown)   BMI 23 13 kg/m²          Physical Exam  Vitals signs and nursing note reviewed  Constitutional:       General: She is not in acute distress  Appearance: Normal appearance  She is not ill-appearing, toxic-appearing or diaphoretic  HENT:      Head: Normocephalic     Cardiovascular:      Rate and Rhythm: Normal rate       Pulses:           Dorsalis pedis pulses are 3+ on the right side and 3+ on the left side  Posterior tibial pulses are 3+ on the right side and 3+ on the left side  Pulmonary:      Effort: Pulmonary effort is normal    Abdominal:      General: Bowel sounds are normal    Musculoskeletal:         General: Swelling, tenderness and deformity present  Comments: I note some tenderness over the dorsal Left first metatarsophalangeal joint  There is tenderness on ROM of the joint  I can palpate osteophytes in the area  Feet:      Right foot:      Protective Sensation: 10 sites tested  10 sites sensed  Left foot:      Protective Sensation: 10 sites tested  10 sites sensed  Skin:     General: Skin is warm and dry  Capillary Refill: Capillary refill takes less than 2 seconds  Neurological:      General: No focal deficit present  Mental Status: She is alert     Psychiatric:         Mood and Affect: Mood normal

## 2020-12-14 NOTE — INTERIM OP NOTE
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

## (undated) DEVICE — NEEDLE 18 G X 1 1/2

## (undated) DEVICE — CURITY NON-ADHERENT STRIPS: Brand: CURITY

## (undated) DEVICE — REM POLYHESIVE ADULT PATIENT RETURN ELECTRODE: Brand: VALLEYLAB

## (undated) DEVICE — GAUZE SPONGES,16 PLY: Brand: CURITY

## (undated) DEVICE — INTENDED FOR TISSUE SEPARATION, AND OTHER PROCEDURES THAT REQUIRE A SHARP SURGICAL BLADE TO PUNCTURE OR CUT.: Brand: BARD-PARKER ® CARBON RIB-BACK BLADES

## (undated) DEVICE — 1.6MM COMPRESSION WIRE 10MM THREAD/150MM LENGTH

## (undated) DEVICE — SUT ETHILON 4-0 PS-2 18 IN 1667H

## (undated) DEVICE — SUT VICRYL 3-0 SH 27 IN J416H

## (undated) DEVICE — NEEDLE 25G X 1 1/2

## (undated) DEVICE — CUFF TOURNIQUET 18 X 4 IN QUICK CONNECT DISP 1 BLADDER

## (undated) DEVICE — STERILE SLQ FOOT ANKLE PACK: Brand: CARDINAL HEALTH

## (undated) DEVICE — OCCLUSIVE GAUZE STRIP,3% BISMUTH TRIBROMOPHENATE IN PETROLATUM BLEND: Brand: XEROFORM

## (undated) DEVICE — DRAPE C-ARM X-RAY

## (undated) DEVICE — INTENDED FOR TISSUE SEPARATION, AND OTHER PROCEDURES THAT REQUIRE A SHARP SURGICAL BLADE TO PUNCTURE OR CUT.: Brand: BARD-PARKER SAFETY BLADES SIZE 15, STERILE

## (undated) DEVICE — 2.0MM DRILL BIT/QC/100MM

## (undated) DEVICE — CURITY STRETCH BANDAGE: Brand: CURITY

## (undated) DEVICE — GLOVE SRG BIOGEL 7.5

## (undated) DEVICE — SCD SEQUENTIAL COMPRESSION COMFORT SLEEVE MEDIUM KNEE LENGTH: Brand: KENDALL SCD

## (undated) DEVICE — CAP PROTECTIVE 0.062IN TPR FIT

## (undated) DEVICE — BONE STIMULATOR NON INVASIVE

## (undated) DEVICE — GLOVE INDICATOR PI UNDERGLOVE SZ 7 BLUE

## (undated) DEVICE — 1.6MM KIRSCHNER WIRE, TROCAR POINTS ON BOTH ENDS 150MM
Type: IMPLANTABLE DEVICE | Site: FOOT | Status: NON-FUNCTIONAL
Removed: 2017-02-13

## (undated) DEVICE — CHLORAPREP HI-LITE 26ML ORANGE

## (undated) DEVICE — BANDAGE, ESMARK LF STR 4"X9'(20/CS): Brand: CYPRESS

## (undated) DEVICE — DRAPE C-ARM 48 X 84 IN F/ OEC MINIVIEW 6800

## (undated) DEVICE — U-DRAPE: Brand: CONVERTORS

## (undated) DEVICE — ACE WRAP 4 IN UNSTERILE

## (undated) DEVICE — GLOVE SRG BIOGEL 7

## (undated) DEVICE — SUT VICRYL 2-0 SH 27 IN UNDYED J417H

## (undated) DEVICE — 10FR FRAZIER SUCTION HANDLE: Brand: CARDINAL HEALTH

## (undated) DEVICE — 2000CC GUARDIAN II: Brand: GUARDIAN

## (undated) DEVICE — BANDAGE, ESMARK LF STR 6"X9' (20/CS): Brand: CYPRESS

## (undated) DEVICE — STERI DRAPE 1000 NON-STERILE ROLL

## (undated) DEVICE — TUBING SUCTION 5MM X 12 FT

## (undated) DEVICE — GLOVE INDICATOR PI UNDERGLOVE SZ 7.5 BLUE

## (undated) DEVICE — SYRINGE 10ML LL

## (undated) DEVICE — STOCKINETTE REGULAR

## (undated) DEVICE — BLADE SAGITTAL 25.6 X 9.5MM

## (undated) DEVICE — STANDARD SURGICAL GOWN, L: Brand: CONVERTORS

## (undated) DEVICE — GLOVE INDICATOR PI UNDERGLOVE SZ 6.5 BLUE

## (undated) DEVICE — PRECISION THIN (9.0 X 0.38 X 25.0MM)

## (undated) DEVICE — CAST PADDING 4 IN SYNTHETIC NON-STRL